# Patient Record
Sex: MALE | Race: BLACK OR AFRICAN AMERICAN | NOT HISPANIC OR LATINO | Employment: OTHER | URBAN - METROPOLITAN AREA
[De-identification: names, ages, dates, MRNs, and addresses within clinical notes are randomized per-mention and may not be internally consistent; named-entity substitution may affect disease eponyms.]

---

## 2017-01-03 DIAGNOSIS — M17.0 PRIMARY OSTEOARTHRITIS OF BOTH KNEES: Primary | ICD-10-CM

## 2017-01-03 DIAGNOSIS — N18.30 CHRONIC KIDNEY DISEASE, STAGE III (MODERATE): Primary | ICD-10-CM

## 2017-01-03 RX ORDER — SODIUM BICARBONATE 650 MG/1
TABLET ORAL
Refills: 3 | Status: CANCELLED | COMMUNITY
Start: 2017-01-03

## 2017-01-04 ENCOUNTER — CLINICAL SUPPORT (OUTPATIENT)
Dept: REHABILITATION | Facility: HOSPITAL | Age: 72
End: 2017-01-04
Attending: NEUROLOGICAL SURGERY
Payer: MEDICARE

## 2017-01-04 DIAGNOSIS — R26.9 GAIT DIFFICULTY: ICD-10-CM

## 2017-01-04 PROCEDURE — 97110 THERAPEUTIC EXERCISES: CPT | Mod: PO

## 2017-01-04 RX ORDER — SODIUM BICARBONATE 650 MG/1
TABLET ORAL
Refills: 3 | COMMUNITY
Start: 2017-01-04 | End: 2017-05-12 | Stop reason: SDUPTHER

## 2017-01-04 NOTE — PROGRESS NOTES
"Name: Jesús Olvera  [facility] Number: [id number]  2017.     Diagnosis: s/p lumbar fusion   Physician: Ugo Saah MD  Treatment Orders: PT Eval and Treat    Past Medical History   Diagnosis Date    Anemia associated with chronic renal failure     Arthritis     CAD (coronary atherosclerotic disease)     CHF (congestive heart failure)     -donor kidney transplant 2004    Diabetes mellitus     Encounter for blood transfusion     GIB (gastrointestinal bleeding) 10/23/2014    Neurogenic bladder     Osteomyelitis of lumbar vertebra 10/20/2014    PVD (peripheral vascular disease)      Heel ulcers with osteomyelitis, subsequent hemipedectomy on the left      Renal hypertension     Secondary hyperparathyroidism of renal origin     Secondary myopathy      steriod induced    Thyroid nodule 2016     Current Outpatient Prescriptions   Medication Sig    amlodipine (NORVASC) 10 MG tablet Take 10 mg by mouth once daily.      aspirin 81 MG Chew Take 81 mg by mouth once daily.    BD INSULIN SYRINGE ULTRA-FINE 1/2 mL 31 x 5/16" Syrg     carvedilol (COREG) 25 MG tablet Take 2 tablets (50 mg total) by mouth 2 (two) times daily with meals.    dorzolamide-timolol 2-0.5% (COSOPT) 22.3-6.8 mg/mL ophthalmic solution     ferrous sulfate 325 (65 FE) MG EC tablet Take 1 tablet (325 mg total) by mouth once daily.    gabapentin (NEURONTIN) 300 MG capsule Take 300 mg by mouth 3 (three) times daily.    hydrALAZINE (APRESOLINE) 10 MG tablet Take 1 tablet (10 mg total) by mouth every 12 (twelve) hours.    insulin aspart (NOVOLOG) 100 unit/mL injection Inject 10 Units into the skin 3 (three) times daily with meals. (Patient taking differently: Inject 10 Units into the skin 3 (three) times daily with meals. Pt stated that he is taking 13 units 3 times daily w/ meals. CF)    LANTUS 100 unit/mL injection Inject 20 Units into the skin 2 (two) times daily.    LUMIGAN 0.01 % Drop Place 1 drop into both eyes " every evening.     multivitamin capsule Take 1 capsule by mouth once daily.    mycophenolate (CELLCEPT) 250 mg Cap Take 2 capsules (500 mg total) by mouth 2 (two) times daily.    omeprazole (PRILOSEC) 20 MG capsule Take 2 capsules (40 mg total) by mouth once daily.    oxycodone-acetaminophen (LYNOX) 7.5-300 mg per tablet Take 1 tablet by mouth every 6 (six) hours as needed for Pain.    oxycodone-acetaminophen (PERCOCET) 7.5-325 mg per tablet Take 1 tablet by mouth every 4 (four) hours as needed for Pain.    predniSONE (DELTASONE) 5 MG tablet Take 5 mg by mouth once daily.      senna-docusate 8.6-50 mg (PERICOLACE) 8.6-50 mg per tablet Take 1 tablet by mouth 2 (two) times daily as needed for Constipation.    simvastatin (ZOCOR) 40 MG tablet 40 mg every evening.     sodium bicarbonate 650 MG tablet TAKE 1 TABLET (650 MG TOTAL) BY MOUTH ONCE DAILY.    tacrolimus (PROGRAF) 1 MG Cap Take 2 capsules (2 mg total) by mouth every 12 (twelve) hours.    tamsulosin (FLOMAX) 0.4 mg Cp24 take 1 capsule by mouth once daily    valsartan (DIOVAN) 160 MG tablet Take 160 mg by mouth once daily.    vitamin renal formula, B-complex-vitamin c-folic acid, (TRIPHROCAPS) 1 mg Cap Take 1 capsule by mouth once daily.     No current facility-administered medications for this visit.      Review of patient's allergies indicates:  No Known Allergies  Precautions: fall; back brace 2/2 hardware failure of previous lumbar fusion       Subjective:      Jesús reports no new complaints. Reports both legs hurt when he is standing.     Pain is rated on a 0-10 scale with 0 being no pain and 10 being pain requiring emergency room visit.    Diagnostic Tests: Radiographs    Patient Goals for PT: standing, walking with AD      Objective:    Visit # 13  Time In: 1300  Time Out: 1400  Treatment time: 45'  Date of eval/re-eval: 12/1/2016    Gait 40' with WW, KIRILL followed behind, multiple sitting breaks  STS transfers mod [I]  WC to chair/mat  "transfers mod [I]  Standing balance 1 UE support 15"   Standing balance no UE support unable      [3] Therapeutic exercise x 15'   Breathing with forced exhale and glute sets 20x   3 x 12 of:    resisted shoulder extension pink cook band    LAQ 3# weights R/L alternating    rows orange cook band     Gait training 1' clock, 3' rest    3x STS, 10' gait with WW, WC following, STS with remaining time   ~ 5' on 3rd set   Written HEP Provided: yes    Patient education: HEP; 20" work 40" rest with activities  Jesús demo good understanding of the education provided. Patient demo good return demo of skill of exercises.[id number]    Problem List: deconditioned, co-morbidities; LE weakness;       Assessment:    Jesús tolerated tx without increase in sxs. Progressed distance for gait. Added STS for more volume.    Jesús will benefit from skilled PT services to address these impairments and progress towards the below listed functional goals.  Jesús is in agreement with the goals that will be addressed in the treatment plan.Jesús demonstrates no additional cultural, spiritual or educational needs and currently has no barriers to learning.      Medical necessity: see Problem List      Functional Goals  Time frame     1.   The pt will stand unsupported with normal posture  for > 5' without increase in sxs.  ongoing  6 weeks     2.   The pt will tolerate standing frame > 20" without increase in sxs. ongoing  6 weeks     3.   The pt will ambulate > 20' with WW for improved household ambulation. Ongoing   6-8 weeks     4.   The pt will be independent in performance and progression of HEP. ongoing    2-3 visits            Plan:      Proceed/Continue with POC.     Pt will be treated by physical therapy 1-2x/week during the certification period. Treatment will consist of therapeutic exercise, manual therapy, neuromuscular re-education, heat and cold modalities, electrical stimulation for pain relief and/or muscle activation, and any other " types of treatment hat may be deemed medically necessary. [place] may at times be seen by a PTA as part of the Rehab Team.       Physical Therapist: KRISTAL Bautista, PT, DPT, CSCS    I certify the need for these services furnished under this plan of treatment and while under my care.       ___________________________________  Physician/Referring Practitioner        _________________  Date of Signature

## 2017-01-10 ENCOUNTER — TELEPHONE (OUTPATIENT)
Dept: NEUROSURGERY | Facility: CLINIC | Age: 72
End: 2017-01-10

## 2017-01-10 DIAGNOSIS — M46.46 LUMBAR DISCITIS: ICD-10-CM

## 2017-01-10 DIAGNOSIS — M17.0 OSTEOARTHRITIS OF BOTH LOWER LEGS: Primary | ICD-10-CM

## 2017-01-10 DIAGNOSIS — Z98.1 HISTORY OF LUMBAR FUSION: ICD-10-CM

## 2017-01-10 NOTE — TELEPHONE ENCOUNTER
----- Message from Kailee Pa MA sent at 1/10/2017  4:54 PM CST -----  Just a reminder to let Hans know he is co-surgeon on this case with Yifan 1/25/2017.    Thanks

## 2017-01-11 ENCOUNTER — CLINICAL SUPPORT (OUTPATIENT)
Dept: REHABILITATION | Facility: HOSPITAL | Age: 72
End: 2017-01-11
Attending: NEUROLOGICAL SURGERY
Payer: MEDICARE

## 2017-01-11 DIAGNOSIS — R26.9 GAIT DIFFICULTY: ICD-10-CM

## 2017-01-11 PROCEDURE — G8979 MOBILITY GOAL STATUS: HCPCS | Mod: CK,PO

## 2017-01-11 PROCEDURE — G8978 MOBILITY CURRENT STATUS: HCPCS | Mod: CL,PO

## 2017-01-11 PROCEDURE — 97110 THERAPEUTIC EXERCISES: CPT | Mod: PO

## 2017-01-11 NOTE — PROGRESS NOTES
"Name: Jesús Olvera  [facility] Number: [id number]  2017.     Diagnosis: s/p lumbar fusion   Physician: Ugo Saha MD  Treatment Orders: PT Eval and Treat    Past Medical History   Diagnosis Date    Anemia associated with chronic renal failure     Arthritis     CAD (coronary atherosclerotic disease)     CHF (congestive heart failure)     -donor kidney transplant 2004    Diabetes mellitus     Encounter for blood transfusion     GIB (gastrointestinal bleeding) 10/23/2014    Neurogenic bladder     Osteomyelitis of lumbar vertebra 10/20/2014    PVD (peripheral vascular disease)      Heel ulcers with osteomyelitis, subsequent hemipedectomy on the left      Renal hypertension     Secondary hyperparathyroidism of renal origin     Secondary myopathy      steriod induced    Thyroid nodule 2016     Current Outpatient Prescriptions   Medication Sig    amlodipine (NORVASC) 10 MG tablet Take 10 mg by mouth once daily.      aspirin 81 MG Chew Take 81 mg by mouth once daily.    BD INSULIN SYRINGE ULTRA-FINE 1/2 mL 31 x 5/16" Syrg     carvedilol (COREG) 25 MG tablet Take 2 tablets (50 mg total) by mouth 2 (two) times daily with meals.    dorzolamide-timolol 2-0.5% (COSOPT) 22.3-6.8 mg/mL ophthalmic solution     ferrous sulfate 325 (65 FE) MG EC tablet Take 1 tablet (325 mg total) by mouth once daily.    gabapentin (NEURONTIN) 300 MG capsule Take 300 mg by mouth 3 (three) times daily.    hydrALAZINE (APRESOLINE) 10 MG tablet Take 1 tablet (10 mg total) by mouth every 12 (twelve) hours.    insulin aspart (NOVOLOG) 100 unit/mL injection Inject 10 Units into the skin 3 (three) times daily with meals. (Patient taking differently: Inject 10 Units into the skin 3 (three) times daily with meals. Pt stated that he is taking 13 units 3 times daily w/ meals. CF)    LANTUS 100 unit/mL injection Inject 20 Units into the skin 2 (two) times daily.    LUMIGAN 0.01 % Drop Place 1 drop into both eyes " every evening.     multivitamin capsule Take 1 capsule by mouth once daily.    mycophenolate (CELLCEPT) 250 mg Cap Take 2 capsules (500 mg total) by mouth 2 (two) times daily.    omeprazole (PRILOSEC) 20 MG capsule Take 2 capsules (40 mg total) by mouth once daily.    oxycodone-acetaminophen (LYNOX) 7.5-300 mg per tablet Take 1 tablet by mouth every 6 (six) hours as needed for Pain.    oxycodone-acetaminophen (PERCOCET) 7.5-325 mg per tablet Take 1 tablet by mouth every 4 (four) hours as needed for Pain.    predniSONE (DELTASONE) 5 MG tablet Take 5 mg by mouth once daily.      senna-docusate 8.6-50 mg (PERICOLACE) 8.6-50 mg per tablet Take 1 tablet by mouth 2 (two) times daily as needed for Constipation.    simvastatin (ZOCOR) 40 MG tablet 40 mg every evening.     sodium bicarbonate 650 MG tablet TAKE 1 TABLET (650 MG TOTAL) BY MOUTH ONCE DAILY.    tacrolimus (PROGRAF) 1 MG Cap Take 2 capsules (2 mg total) by mouth every 12 (twelve) hours.    tamsulosin (FLOMAX) 0.4 mg Cp24 take 1 capsule by mouth once daily    valsartan (DIOVAN) 160 MG tablet Take 160 mg by mouth once daily.    vitamin renal formula, B-complex-vitamin c-folic acid, (TRIPHROCAPS) 1 mg Cap Take 1 capsule by mouth once daily.     No current facility-administered medications for this visit.      Review of patient's allergies indicates:  No Known Allergies  Precautions: fall; back brace 2/2 hardware failure of previous lumbar fusion       Subjective:      Jesús reports no new complaints. Reports both legs hurt when he is standing.     Pain is rated on a 0-10 scale with 0 being no pain and 10 being pain requiring emergency room visit.    Diagnostic Tests: Radiographs    Patient Goals for PT: standing, walking with AD      Objective:    Visit # 14  Time In: 1300  Time Out: 1400  Treatment time: 45'  Date of eval/re-eval: 12/1/2016    Gait 40' with WW, KIRILL followed behind, multiple sitting breaks  STS transfers mod [I]  WC to chair/mat  "transfers mod [I]  Standing balance 1 UE support 15"   Standing balance no UE support unable      [3] Therapeutic exercise x 15'   Nu-step 10'   LAQ 3 sets of max reps with 5" hold 3#, 3' rest between sets ~ 10x/set  Standing frame 10' 4 x 10 shoulder flexion throughout 10'   4 step up/down with mod assist BUE support    --np--  Breathing with forced exhale and glute sets 20x   3 x 12 of:    resisted shoulder extension pink cook band    LAQ 3# weights R/L alternating    rows orange cook band     Gait training 1' clock, 3' rest    3x STS, 10' gait with WW, WC following, STS with remaining time   ~ 5' on 3rd set   Written HEP Provided: yes    Patient education: HEP; 20" work 40" rest with activities  Jesús demo good understanding of the education provided. Patient demo good return demo of skill of exercises.1315    Problem List: deconditioned, co-morbidities; LE weakness;       Assessment:    Jesús tolerated tx without increase in sxs. Able to perform stair climbing, which is promising as he has steps at home. Will update goals to include stair goal.  Consider 2x/week PT treatments since doing well and tolerating activities.   Jesús will benefit from skilled PT services to address these impairments and progress towards the below listed functional goals.  eJsús is in agreement with the goals that will be addressed in the treatment plan.Jesús demonstrates no additional cultural, spiritual or educational needs and currently has no barriers to learning.      Medical necessity: see Problem List      Functional Goals  Time frame     1.   The pt will stand unsupported with normal posture  for > 5' without increase in sxs.  ongoing  6 weeks     2.   The pt will tolerate standing frame > 20" without increase in sxs. Ongoing 10'   6 weeks     3.   The pt will ambulate > 20' with WW for improved household ambulation. MET  6-8 weeks     4.   The pt will be independent in performance and progression of HEP. ongoing    2-3 " visits     5.    The pt will ambulate safely / [I] up/down the flight up stairs in his home using BUE support for improved household ambulation.   6 weeks     6.    The pt will ambulate > 100' in 2 minutes with WW for improved household ambulation.   6 weeks                      Plan:      Proceed/Continue with POC.     Pt will be treated by physical therapy 1-2x/week during the certification period. Treatment will consist of therapeutic exercise, manual therapy, neuromuscular re-education, heat and cold modalities, electrical stimulation for pain relief and/or muscle activation, and any other types of treatment hat may be deemed medically necessary. Jesús may at times be seen by a PTA as part of the Rehab Team.       Physical Therapist: KRISTAL Bautista, PT, DPT, CSCS    I certify the need for these services furnished under this plan of treatment and while under my care.       ___________________________________  Physician/Referring Practitioner        _________________  Date of Signature

## 2017-01-13 ENCOUNTER — TELEPHONE (OUTPATIENT)
Dept: NEUROSURGERY | Facility: CLINIC | Age: 72
End: 2017-01-13

## 2017-01-13 DIAGNOSIS — Z98.1 HISTORY OF LUMBAR FUSION: Primary | ICD-10-CM

## 2017-01-13 DIAGNOSIS — M46.46 LUMBAR DISCITIS: ICD-10-CM

## 2017-01-24 ENCOUNTER — OFFICE VISIT (OUTPATIENT)
Dept: NEUROSURGERY | Facility: CLINIC | Age: 72
End: 2017-01-24
Payer: MEDICARE

## 2017-01-24 ENCOUNTER — HOSPITAL ENCOUNTER (OUTPATIENT)
Dept: RADIOLOGY | Facility: HOSPITAL | Age: 72
Discharge: HOME | End: 2017-01-24
Attending: NEUROLOGICAL SURGERY
Payer: MEDICARE

## 2017-01-24 VITALS
SYSTOLIC BLOOD PRESSURE: 134 MMHG | HEART RATE: 55 BPM | HEIGHT: 74 IN | DIASTOLIC BLOOD PRESSURE: 70 MMHG | BODY MASS INDEX: 26.95 KG/M2 | WEIGHT: 210 LBS

## 2017-01-24 DIAGNOSIS — N25.0 RENAL OSTEODYSTROPHY: ICD-10-CM

## 2017-01-24 DIAGNOSIS — M40.05 POSTURAL KYPHOSIS OF THORACOLUMBAR REGION: Primary | ICD-10-CM

## 2017-01-24 DIAGNOSIS — M40.05 POSTURAL KYPHOSIS OF THORACOLUMBAR REGION: ICD-10-CM

## 2017-01-24 PROCEDURE — 99499 UNLISTED E&M SERVICE: CPT | Mod: S$GLB,,, | Performed by: NEUROLOGICAL SURGERY

## 2017-01-24 PROCEDURE — 3078F DIAST BP <80 MM HG: CPT | Mod: S$GLB,,, | Performed by: NEUROLOGICAL SURGERY

## 2017-01-24 PROCEDURE — 1126F AMNT PAIN NOTED NONE PRSNT: CPT | Mod: S$GLB,,, | Performed by: NEUROLOGICAL SURGERY

## 2017-01-24 PROCEDURE — 99204 OFFICE O/P NEW MOD 45 MIN: CPT | Mod: S$GLB,,, | Performed by: NEUROLOGICAL SURGERY

## 2017-01-24 PROCEDURE — 72020 X-RAY EXAM OF SPINE 1 VIEW: CPT | Mod: TC

## 2017-01-24 PROCEDURE — 3075F SYST BP GE 130 - 139MM HG: CPT | Mod: S$GLB,,, | Performed by: NEUROLOGICAL SURGERY

## 2017-01-24 PROCEDURE — 1159F MED LIST DOCD IN RCRD: CPT | Mod: S$GLB,,, | Performed by: NEUROLOGICAL SURGERY

## 2017-01-24 PROCEDURE — 1160F RVW MEDS BY RX/DR IN RCRD: CPT | Mod: S$GLB,,, | Performed by: NEUROLOGICAL SURGERY

## 2017-01-24 PROCEDURE — 99999 PR PBB SHADOW E&M-EST. PATIENT-LVL IV: CPT | Mod: PBBFAC,,, | Performed by: NEUROLOGICAL SURGERY

## 2017-01-24 PROCEDURE — 1157F ADVNC CARE PLAN IN RCRD: CPT | Mod: S$GLB,,, | Performed by: NEUROLOGICAL SURGERY

## 2017-01-24 PROCEDURE — 72020 X-RAY EXAM OF SPINE 1 VIEW: CPT | Mod: 26,,, | Performed by: RADIOLOGY

## 2017-01-24 RX ORDER — OXYCODONE AND ACETAMINOPHEN 7.5; 325 MG/1; MG/1
1 TABLET ORAL EVERY 4 HOURS PRN
Qty: 120 TABLET | Refills: 0 | Status: SHIPPED | OUTPATIENT
Start: 2017-01-24 | End: 2017-12-05

## 2017-01-24 NOTE — LETTER
January 24, 2017      Ugo Saha MD  [address]  [facility] [address]           [facility]  [address]  [facility] [address]  Phone: [phone]          Patient: Jesús Olvera   MR Number: [id number]   YOB: 1945   Date of Visit: 1/24/2017       Dear Dr. Ugo Saha:    Thank you for referring Jesús Olvera to me for evaluation. Attached you will find relevant portions of my assessment and plan of care.    If you have questions, please do not hesitate to call me. I look forward to following Jesús Olvera along with you.    Sincerely,    William Maxwell MD    Enclosure  CC:  No Recipients    If you would like to receive this communication electronically, please contact [email or web address] or [phone] to request more information on [employer] access.    For providers and/or their staff who would like to refer a patient to [facility], please contact us through our one-stop-shop provider referral line, [facility], at [phone].    If you feel you have received this communication in error or would no longer like to receive these types of communications, please e-mail [email or web address]

## 2017-01-24 NOTE — PROGRESS NOTES
Dear Dr. Saha,    Thank you for referring this patient to my clinic. The full details of my evaluation will also be forthcoming to you by letter.    CHIEF COMPLAINT:  Consult    I, Joanne Edwards, am scribing for, and in the presence of, Dr. Maxwell.    HPI:  Jesús Olvera is a 71 y.o.  male with a history of lumbar osteomyelitis, peripheral vascular disease, CAD, CHF, kidney transplant, diabetes mellitus, and on Aspirin, who is referred to me by Dr. Saha to for evaluation of low back pain. Pt presents today to discuss surgery. Pt reports low back pain; however, the pain is somewhat tolerable with pain medication. Pt does not walk, secondary to back pain and BLE weakness. Pt reports stiffness in the low back while standing but denies pain. Pt denies b/b dysfunction. Pt notes some improvement in back pain while using the TLSO brace.    Pt presents today in a TLSO brace and a wheelchair. Pt was able to stand/walk with a walker in ; however, shortly after, pt became ill with pneumonia and a bladder infection. Pt states that he never fully recovered from these illnesses resulting in BLE weakness. Pt's kidney transplant was 12 years ago.    Review of patient's allergies indicates:  No Known Allergies    Past Medical History   Diagnosis Date    Anemia associated with chronic renal failure     Arthritis     CAD (coronary atherosclerotic disease)     CHF (congestive heart failure)     -donor kidney transplant 2004    Diabetes mellitus     Encounter for blood transfusion     GIB (gastrointestinal bleeding) 10/23/2014    Neurogenic bladder     Osteomyelitis of lumbar vertebra 10/20/2014    PVD (peripheral vascular disease)      Heel ulcers with osteomyelitis, subsequent hemipedectomy on the left      Renal hypertension     Secondary hyperparathyroidism of renal origin     Secondary myopathy      steriod induced    Thyroid nodule 2016     Past Surgical History   Procedure Laterality Date     Hemipedectomy       left foot    Kidney transplant  8/2/04    Back surgery      Eye surgery      Vascular surgery       Family History   Problem Relation Age of Onset    Alzheimer's disease Mother     Diabetes Mother     Heart disease Father     Diabetes Sister     Diabetes Brother      Social History   Substance Use Topics    Smoking status: Former Smoker     Quit date: 3/14/1974    Smokeless tobacco: Never Used    Alcohol use No      Comment: stopped 25yrs ago        Review of Systems   HENT: Negative.    Eyes: Negative.    Respiratory: Negative.    Cardiovascular: Negative.    Gastrointestinal: Negative.    Endocrine: Negative.    Genitourinary: Negative.    Musculoskeletal: Positive for back pain (Low back pain) and gait problem (Wheelchair). Negative for neck pain.   Skin: Negative.    Allergic/Immunologic: Negative.    Neurological: Positive for weakness (BLE). Negative for light-headedness, numbness and headaches.   Hematological: Negative.    Psychiatric/Behavioral: Negative.        OBJECTIVE:   Vital Signs:  Pulse: (!) 55 (01/24/17 0830)  BP: 134/70 (01/24/17 0830)    Physical Exam:    Vital signs: All nursing notes and vital signs reviewed -- afebrile, vital signs stable.  Constitutional: Patient sitting comfortably in chair. Appears well developed and well nourished.  Skin: Exposed areas are intact without abnormal markings, rashes or other lesions. Well healed scar lumbosacral region.  HEENT: Normocephalic. Normal conjunctivae.  Cardiovascular: Normal rate and regular rhythm.  Respiratory: Chest wall rises and falls symmetrically, without signs of respiratory distress.  Abdomen: Soft and non-tender.  Extremities: Warm and without edema. Calves supple, non-tender.  Psych/Behavior: Normal affect.    Neurological:    Mental status: Alert and oriented. Conversational and appropriate.       Cranial Nerves: Grossly intact.    Motor:    Upper:  Deltoids Triceps Biceps WE WF     R 5/5 5/5 5/5  5/5 5/5 5/5    L 5/5 5/5 5/5 5/5 5/5 5/5      Lower:  HF KE KF DF PF EHL    R 4-/5 5/5 5/5 2/5 2/5 2/5    L 4-/5 4/5 5/5 5/5 5/5 5/5     Partial amputation left foot.    Sensory: Intact sensation to light touch in all extremities. Romberg negative.    Reflexes:          DTR: 2+ symmetrically throughout.     Araujo's: Negative.     Babinski's: Negative.     Clonus: Negative.    Cerebellar: Finger-to-nose and rapid alternating movements normal. Gait stable, fluid.    Spine:    Posture: Head well aligned over pelvis in front and side views.  No focal or global spinal deformity visible on inspection. Shoulders and hips even. No obvious leg length discrepancy. No scapula winging.    Bending: Full ROM with forward, back and lateral bending. No rib prominence with forward bend.    Cervical:      ROM: Full with flexion, extension, lateral rotation and ear-to-shoulder bend.      Midline TTP: Negative.     Spurling's test: Negative.     Lhermitte's: Negative.    Thoracic:     Midline TTP: Negative    Lumbar:     Midline TTP: Negative     Straight Leg Test: Negative     Crossed Straight Leg Test: Negative     Sciatic notch tenderness: Negative.    Other:     SI joint TTP: Negative.     Greater trochanter TTP: Negative.     Tenderness with external/internal hip rotation: Negative.    Diagnostic Results:  All imaging was independently reviewed by me.    CT L-spine, dated 9/15/2016:  1. Screw leucencies in bilateral L3 screws and bilateral S1 screws  2. Advanced degeneration of L2 vertebral body, superior L3 vertebral, and L2 disc space    Scoliosis standing AP and Lateral X-ray, dated 9/27/2016:  1. Proximal junctional kyphosis above L3 measuring 18 degrees  2. Great than 10 cm positive sagittal balance  3. Less than 5 cm positive right global coronal balance    ASSESSMENT/PLAN:     Jesús Olvera is a renal transplant patient on chronic immunosuppression with multiple previous instrumented lumbar fusions for  discitis/osteomyelitis who now presents with lumbar hardware failure and proximal junctional failure causing sagittal plane deformity. The hardware failure is a consequence of his renal osteodystrophy, chronic immunosuppression therapy and resultant poor bone quality. He had been scheduled for extension and revision of his previous hardware by my colleague, Dr. Saha, for debilitating pain with movement and inability to walk due to pain. Today he reports significant improvement in his back pain since his last encounter, which is now well tolerated with only 1-2 opioid pills a day. He is unable to ambulate due to deconditioning of his legs after a severe pneumonia requiring hospitalization 6 months ago, but not due to pain or postural deformity. He has made some improvement with his leg strength and transitioning with rehab. Given his medical status and poor bone quality, I estimate his surgical risk to be greater than 90%. As his pain is now tolerated, the benefits do not outweigh the risks at this time, and I have advised against proceeding with surgery now. I will acquire dynamic xrays to reassess for gross instability. I will also reorder PT, refill his narcotics, and refer him to Endocrinology for evaluation and treatment of his renal osteodystrophy. He will need to strengthen his bones should surgery become necessary in the future. He will follow up with me in 3 months for re-evaluation.    The patient understands and agrees with the plan of care. All questions were answered.     1. Upright Supine X-ray  2. Referral to physical therapy  3. Referral to Endocrinology  4. Refill Percocet  5. RTC 3 months    I, Dr. Maxwell, personally performed the services described in this documentation as scribed by Joanne Edwards in my presence, and it is both accurate and complete.      William Maxwell M.D.  Department of Neurosurgery  [facility]

## 2017-01-30 DIAGNOSIS — E11.9 TYPE 2 DIABETES MELLITUS WITHOUT COMPLICATION: ICD-10-CM

## 2017-01-31 DIAGNOSIS — N18.30 CHRONIC KIDNEY DISEASE, STAGE III (MODERATE): Primary | ICD-10-CM

## 2017-01-31 RX ORDER — MYCOPHENOLATE MOFETIL 250 MG/1
500 CAPSULE ORAL 2 TIMES DAILY
Qty: 120 CAPSULE | Refills: 0 | Status: SHIPPED | OUTPATIENT
Start: 2017-01-31 | End: 2017-04-05 | Stop reason: SDUPTHER

## 2017-02-06 ENCOUNTER — OFFICE VISIT (OUTPATIENT)
Dept: ENDOCRINOLOGY | Facility: CLINIC | Age: 72
End: 2017-02-06
Payer: MEDICARE

## 2017-02-06 ENCOUNTER — CLINICAL SUPPORT (OUTPATIENT)
Dept: REHABILITATION | Facility: HOSPITAL | Age: 72
End: 2017-02-06
Attending: NEUROLOGICAL SURGERY
Payer: MEDICARE

## 2017-02-06 VITALS
SYSTOLIC BLOOD PRESSURE: 124 MMHG | WEIGHT: 210 LBS | HEIGHT: 74 IN | HEART RATE: 71 BPM | BODY MASS INDEX: 26.95 KG/M2 | DIASTOLIC BLOOD PRESSURE: 83 MMHG

## 2017-02-06 DIAGNOSIS — S32.000S COMPRESSION FRACTURE OF LUMBAR VERTEBRA, SEQUELA: Primary | ICD-10-CM

## 2017-02-06 DIAGNOSIS — E55.9 VITAMIN D DEFICIENCY: ICD-10-CM

## 2017-02-06 DIAGNOSIS — R53.81 DEBILITY: ICD-10-CM

## 2017-02-06 DIAGNOSIS — E21.3 HYPERPARATHYROIDISM: ICD-10-CM

## 2017-02-06 DIAGNOSIS — Z94.0 DECEASED-DONOR KIDNEY TRANSPLANT: Chronic | ICD-10-CM

## 2017-02-06 DIAGNOSIS — E10.42 TYPE 1 DIABETES MELLITUS WITH DIABETIC POLYNEUROPATHY: ICD-10-CM

## 2017-02-06 DIAGNOSIS — R26.9 GAIT DIFFICULTY: ICD-10-CM

## 2017-02-06 DIAGNOSIS — D84.9 IMMUNOSUPPRESSED STATUS: ICD-10-CM

## 2017-02-06 PROCEDURE — G8979 MOBILITY GOAL STATUS: HCPCS | Mod: CK,PO

## 2017-02-06 PROCEDURE — 99999 PR PBB SHADOW E&M-EST. PATIENT-LVL III: CPT | Mod: PBBFAC,GC,, | Performed by: INTERNAL MEDICINE

## 2017-02-06 PROCEDURE — 99204 OFFICE O/P NEW MOD 45 MIN: CPT | Mod: GC,S$GLB,, | Performed by: INTERNAL MEDICINE

## 2017-02-06 PROCEDURE — 2022F DILAT RTA XM EVC RTNOPTHY: CPT | Mod: GC,S$GLB,, | Performed by: INTERNAL MEDICINE

## 2017-02-06 PROCEDURE — 3045F PR MOST RECENT HEMOGLOBIN A1C LEVEL 7.0-9.0%: CPT | Mod: GC,S$GLB,, | Performed by: INTERNAL MEDICINE

## 2017-02-06 PROCEDURE — 1157F ADVNC CARE PLAN IN RCRD: CPT | Mod: GC,S$GLB,, | Performed by: INTERNAL MEDICINE

## 2017-02-06 PROCEDURE — 1160F RVW MEDS BY RX/DR IN RCRD: CPT | Mod: GC,S$GLB,, | Performed by: INTERNAL MEDICINE

## 2017-02-06 PROCEDURE — 1126F AMNT PAIN NOTED NONE PRSNT: CPT | Mod: GC,S$GLB,, | Performed by: INTERNAL MEDICINE

## 2017-02-06 PROCEDURE — 1159F MED LIST DOCD IN RCRD: CPT | Mod: GC,S$GLB,, | Performed by: INTERNAL MEDICINE

## 2017-02-06 PROCEDURE — 3066F NEPHROPATHY DOC TX: CPT | Mod: GC,S$GLB,, | Performed by: INTERNAL MEDICINE

## 2017-02-06 PROCEDURE — 3074F SYST BP LT 130 MM HG: CPT | Mod: GC,S$GLB,, | Performed by: INTERNAL MEDICINE

## 2017-02-06 PROCEDURE — 3079F DIAST BP 80-89 MM HG: CPT | Mod: GC,S$GLB,, | Performed by: INTERNAL MEDICINE

## 2017-02-06 PROCEDURE — 97110 THERAPEUTIC EXERCISES: CPT | Mod: PO

## 2017-02-06 PROCEDURE — G8978 MOBILITY CURRENT STATUS: HCPCS | Mod: CL,PO

## 2017-02-06 RX ORDER — VIT C/E/ZN/COPPR/LUTEIN/ZEAXAN 250MG-90MG
2000 CAPSULE ORAL DAILY
Refills: 0 | COMMUNITY
Start: 2017-02-06

## 2017-02-06 NOTE — MR AVS SNAPSHOT
[facility]Metab  [address]  [facility] [address]  Phone: [phone]  Fax: [phone]                  Jesús Olvera   2017 8:00 AM   Office Visit    Description:  Male : 1945   Provider:  Katie Rogel MD   Department:  [facility]/Di[facility]/Metab           Reason for Visit     Thyroid Nodule     Diabetes Mellitus           Diagnoses this Visit        Comments    Compression fracture of lumbar vertebra, sequela    -  Primary            To Do List           Future Appointments        Provider Department Dept Phone    2017 11:00 AM Chris Bautista PT [facility] [phone]    2017 8:20 AM [facility], DEXA1 [facility] [phone]    2017 8:50 AM LAB, APPOINTMENT [place] [facility] [phone]    2017 9:00 AM William Maxwell MD [facility] - Neurosurgery [facility] [phone]    2017 10:00 AM LAB, [place] [facility] [phone]      Goals (5 Years of Data)     None      Follow-Up and Disposition     Return in about 1 year (around 2018).      PURCHASE these Medications (No prescription required)        Start End    cholecalciferol, vitamin D3, 1,000 unit capsule 2017     Sig - Route: Take 2 capsules (2,000 Units total) by mouth once daily. - Oral    Class: OTC      [facility] On [facility]     [facility] Care [facility] -  Assistance  Registered nurses in the [facility] provide clinical advisement, health education, appointment booking, and other advisory services.  Call for this free service at [phone].             Medications           Message regarding Medications     Verify the changes and/or additions to your medication regime listed below are the same as discussed with your clinician today.  If any of these changes or additions are incorrect, please notify your healthcare provider.        START taking these NEW medications        Refills     "cholecalciferol, vitamin D3, 1,000 unit capsule 0    Sig: Take 2 capsules (2,000 Units total) by mouth once daily.    Class: OTC    Route: Oral           Verify that the below list of medications is an accurate representation of the medications you are currently taking.  If none reported, the list may be blank. If incorrect, please contact your healthcare provider. Carry this list with you in case of emergency.           Current Medications     amlodipine (NORVASC) 10 MG tablet Take 10 mg by mouth once daily.      aspirin 81 MG Chew Take 81 mg by mouth every evening.     BD INSULIN SYRINGE ULTRA-FINE 1/2 mL 31 x 5/16" Syrg     carvedilol (COREG) 25 MG tablet Take 2 tablets (50 mg total) by mouth 2 (two) times daily with meals.    dorzolamide-timolol 2-0.5% (COSOPT) 22.3-6.8 mg/mL ophthalmic solution     ferrous sulfate 325 (65 FE) MG EC tablet Take 1 tablet (325 mg total) by mouth once daily.    gabapentin (NEURONTIN) 300 MG capsule Take 300 mg by mouth 3 (three) times daily.    hydrALAZINE (APRESOLINE) 10 MG tablet Take 1 tablet (10 mg total) by mouth every 12 (twelve) hours.    insulin aspart (NOVOLOG) 100 unit/mL injection Inject 10 Units into the skin 3 (three) times daily with meals.    LANTUS 100 unit/mL injection Inject 20 Units into the skin 2 (two) times daily.    LUMIGAN 0.01 % Drop Place 1 drop into both eyes every evening.     multivitamin capsule Take 1 capsule by mouth once daily.    mycophenolate (CELLCEPT) 250 mg Cap Take 2 capsules (500 mg total) by mouth 2 (two) times daily.    omeprazole (PRILOSEC) 20 MG capsule Take 2 capsules (40 mg total) by mouth once daily.    oxycodone-acetaminophen (LYNOX) 7.5-300 mg per tablet Take 1 tablet by mouth every 6 (six) hours as needed for Pain.    oxycodone-acetaminophen (PERCOCET) 7.5-325 mg per tablet Take 1 tablet by mouth every 4 (four) hours as needed for Pain.    predniSONE (DELTASONE) 5 MG tablet Take 5 mg by mouth once daily.      senna-docusate 8.6-50 " "mg (PERICOLACE) 8.6-50 mg per tablet Take 1 tablet by mouth 2 (two) times daily as needed for Constipation.    simvastatin (ZOCOR) 40 MG tablet 40 mg every evening.     sodium bicarbonate 650 MG tablet TAKE 1 TABLET (650 MG TOTAL) BY MOUTH ONCE DAILY.    tacrolimus (PROGRAF) 1 MG Cap Take 2 capsules (2 mg total) by mouth every 12 (twelve) hours.    tamsulosin (FLOMAX) 0.4 mg Cp24 take 1 capsule by mouth once daily    valsartan (DIOVAN) 160 MG tablet Take 160 mg by mouth once daily.    vitamin renal formula, B-complex-vitamin c-folic acid, (TRIPHROCAPS) 1 mg Cap Take 1 capsule by mouth once daily.    cholecalciferol, vitamin D3, 1,000 unit capsule Take 2 capsules (2,000 Units total) by mouth once daily.           Clinical Reference Information           Your Vitals Were     BP Pulse Height Weight BMI    124/83 (BP Location: Left arm, Patient Position: Sitting) 71 6' 2" (1.88 m) 95.3 kg (210 lb) 26.96 kg/m2      Blood Pressure          Most Recent Value    BP  124/83      Allergies as of 2/6/2017     No Known Allergies      Immunizations Administered on Date of Encounter - 2/6/2017     None      Orders Placed During Today's Visit     Future Labs/Procedures Expected by Expires    C TELOPEPTIDE (CTX), SERUM  2/6/2017 4/7/2018    DXA Bone Density Spine And Hip_Axial Skeleton  2/6/2017 2/6/2018    PROCOLLAGEN TYPE 1 PROPEPTIDE  2/6/2017 4/7/2018    PTH, intact  2/6/2017 4/7/2018    RENAL FUNCTION PANEL  2/6/2017 4/7/2018    Vitamin D  5/7/2017 4/7/2018      Maintenance Dialysis History     Patient has no recorded history of maintenance dialysis.      Transplant Information        Txp Date Organ Coordinator Care Team    8/2/2004 Kidney Giselle Chavez RN Surgeon:  Ilya Yu MD   Current Nephrologist:  Kingston Thomson MD         Instructions    Start vit d 2000 iu daily over the counter        Language Assistance Services     ATTENTION: Language assistance services are available, free of charge. Please call " [phone].      ATENCIÓN: Si habla español, tiene a suarez disposición servicios gratuitos de asistencia lingüística. Llame al [phone].     CHÚ Ý: N?u b?n nói Ti?ng Vi?t, có các d?ch v? h? tr? ngôn ng? mi?n phí dành cho b?n. G?i s? [phone].         [facility] complies with applicable Federal civil rights laws and does not discriminate on the basis of race, color, national origin, age, disability, or sex.

## 2017-02-06 NOTE — PROGRESS NOTES
I  Bryan Almaraz have personally taken the history and examined this patient and agree with the fellow's note.

## 2017-02-06 NOTE — PROGRESS NOTES
Subjective:      Patient ID: Jesús Olvera is a 71 y.o. male.    Chief Complaint:  Thyroid Nodule and Diabetes Mellitus      History of Present Illness  Initial visit, referred by neurosurgery     Has T1DM managed by Dr Lake at [facility] (since age 28), hx of kidney transplant in 2004 (ESRD 2/2 T1DM was on HD for three years prior to tx), renal osteodystrophy, on chronic immunosuppression: cellcept, prograf, prednisone 5mg daily, and peripheral vascular disease LLE stent, CAD, CHF.     He saw neurosurgery for back pain (history of lumbar osteomyelitis, has hx of multiple previous instrumented lumbar fusions for discitis/osteomyelitis) for consideration of surgery.  He was told to have lumbar hardware failure and proximal junctional failure causing sagittal plane deformity but that he will need to strengthen his bones should surgery become necessary in the future and is currently high surgical risk candidate.     Review of MRI shows he has L2 compression fx. He denies clinical fractures. No prior DEXA or prior OP treatment.   Is not currently taking Ca or Vit D supplements  +fall risk, has fallen many times   Denies kidney stones      Pt presents today in a TLSO brace and a wheelchair. Pt was able to stand/walk with a walker in June; however, shortly after, pt became ill with pneumonia and a bladder infection. Pt states that he never fully recovered from these illnesses resulting in BLE weakness. Working with PT now, takes steps at a time, and does strengthening exercises.     Review of Systems   Constitutional: Positive for fatigue. Negative for unexpected weight change.   Eyes: Negative for visual disturbance.   Respiratory: Negative for shortness of breath.    Cardiovascular: Positive for leg swelling.   Gastrointestinal: Negative for abdominal pain.   Musculoskeletal: Positive for back pain. Negative for myalgias.   Skin: Negative for wound.   Neurological: Positive for weakness and numbness. Negative for  headaches.   Hematological: Bruises/bleeds easily.   Psychiatric/Behavioral: Negative for sleep disturbance.       Objective:   Physical Exam   Constitutional: He appears well-developed.   In wheelchair and wearing back brace    HENT:   Right Ear: External ear normal.   Left Ear: External ear normal.   Nose: Nose normal.   Hearing normal    Neck: No tracheal deviation present. No thyromegaly present.   Cardiovascular: Normal rate.    No murmur heard.  Pulmonary/Chest: Effort normal and breath sounds normal.   Abdominal: Soft. He exhibits no mass.   No hernia noted   Musculoskeletal: He exhibits no edema.   4/5 strength in BLE  Difficulty standing from seated position without assistance    Neurological: He is alert. No cranial nerve deficit or sensory deficit. Coordination and gait normal.   Decreased vibratory sensation b/l LE   +edema  S/p left TMA (about 5 years ago)     Skin: No rash noted.   Right foot with chronic ulcer, no active erythema, pain swelling, warmth or discharge. Pt states he is due to visit podiatry for maintenance    Psychiatric: He has a normal mood and affect. Judgment normal.   Vitals reviewed.      Assessment:     1. Compression fracture of lumbar vertebra, sequela  DXA Bone Density Spine And Hip_Axial Skeleton    C TELOPEPTIDE (CTX), SERUM    PROCOLLAGEN TYPE 1 PROPEPTIDE    Vitamin D    PTH, intact    RENAL FUNCTION PANEL   2. Type 1 diabetes mellitus with diabetic polyneuropathy     3. -donor kidney transplant 04     4. Immunosuppressed status     5. Debility     6. Hyperparathyroidism     7. Vitamin D deficiency          Plan:     Jesús was seen today for thyroid nodule and diabetes mellitus.    Diagnoses and all orders for this visit:    Compression fracture of lumbar vertebra, sequela  Will get DXA of hip and distal 1/3 radius to assess bone (unsure that lumbar DXA will be interpreted w hardware)   High fracture risk given his risk factors:   - Hyperparathyroidism: this can  "be 2HPT of renal origin (persistent s/p RTx) or this could be normocalcemia HPT   - steroid use    - T1DM   Given his vertebral fx, he meets criteria for treatment for OP.   If CTX is high, will choose antiresportive (bisphosphonate or prolia). If CTX is low, can choose to treat with anabolic therapy w forteo   Vit D insufficient, to start 2000 iu daily, recheck PTH, Vit D, renal panel in 3 months.   Encourage PT, weight bearing/strengthening/balancing exercises   -     DXA Bone Density Spine And Hip_Axial Skeleton; Future  -     C TELOPEPTIDE (CTX), SERUM; Future  -     PROCOLLAGEN TYPE 1 PROPEPTIDE; Future  -     Vitamin D; Future  -     PTH, intact; Future  -     RENAL FUNCTION PANEL; Future    Type 1 diabetes mellitus with diabetic polyneuropathy  Discussed benefits of sensor, he wishes to discuss this with Dr Lake who has been managing his DM "for years"     -donor kidney transplant 04    Immunosuppressed status    Debility    Hyperparathyroidism  Reassess labs after Vit D repletion, monitor for now.   This could be 2HPT/renal vs normocalcemia HPT, both of which can contribute to bone loss  Indications for parathyroidectomy in persistent HPT s/p Rental tx include: persistent hypercalcemia/hypercalciuria (which can be measured when he is vit d replete), nephrocalcinosis, osteodystrophy (currently has nl alk phos), vascular calcifications.     Vitamin D deficiency   -     cholecalciferol, vitamin D3, 1,000 unit capsule; Take 2 capsules (2,000 Units total) by mouth once daily.      Discussed w Dr Almaraz   rtc 1 year    "

## 2017-02-06 NOTE — PROGRESS NOTES
"Name: Jesús Olvera  [facility] Number: [id number]  2017.     Diagnosis: s/p lumbar fusion   Physician: Ugo Saha MD  Treatment Orders: PT Eval and Treat    Past Medical History   Diagnosis Date    Anemia associated with chronic renal failure     Arthritis     CAD (coronary atherosclerotic disease)     CHF (congestive heart failure)     -donor kidney transplant 2004    Diabetes mellitus     Encounter for blood transfusion     GIB (gastrointestinal bleeding) 10/23/2014    Neurogenic bladder     Osteomyelitis of lumbar vertebra 10/20/2014    PVD (peripheral vascular disease)      Heel ulcers with osteomyelitis, subsequent hemipedectomy on the left      Renal hypertension     Secondary hyperparathyroidism of renal origin     Secondary myopathy      steriod induced    Thyroid nodule 2016     Current Outpatient Prescriptions   Medication Sig    amlodipine (NORVASC) 10 MG tablet Take 10 mg by mouth once daily.      aspirin 81 MG Chew Take 81 mg by mouth every evening.     BD INSULIN SYRINGE ULTRA-FINE 1/2 mL 31 x 5/16" Syrg     carvedilol (COREG) 25 MG tablet Take 2 tablets (50 mg total) by mouth 2 (two) times daily with meals.    cholecalciferol, vitamin D3, 1,000 unit capsule Take 2 capsules (2,000 Units total) by mouth once daily.    dorzolamide-timolol 2-0.5% (COSOPT) 22.3-6.8 mg/mL ophthalmic solution     ferrous sulfate 325 (65 FE) MG EC tablet Take 1 tablet (325 mg total) by mouth once daily.    gabapentin (NEURONTIN) 300 MG capsule Take 300 mg by mouth 3 (three) times daily.    hydrALAZINE (APRESOLINE) 10 MG tablet Take 1 tablet (10 mg total) by mouth every 12 (twelve) hours.    insulin aspart (NOVOLOG) 100 unit/mL injection Inject 10 Units into the skin 3 (three) times daily with meals. (Patient taking differently: Inject 10 Units into the skin 3 (three) times daily with meals. Pt stated that he is taking 13 units 3 times daily w/ meals. CF)    LANTUS 100 unit/mL " injection Inject 20 Units into the skin 2 (two) times daily.    LUMIGAN 0.01 % Drop Place 1 drop into both eyes every evening.     multivitamin capsule Take 1 capsule by mouth once daily.    mycophenolate (CELLCEPT) 250 mg Cap Take 2 capsules (500 mg total) by mouth 2 (two) times daily.    omeprazole (PRILOSEC) 20 MG capsule Take 2 capsules (40 mg total) by mouth once daily.    oxycodone-acetaminophen (LYNOX) 7.5-300 mg per tablet Take 1 tablet by mouth every 6 (six) hours as needed for Pain.    oxycodone-acetaminophen (PERCOCET) 7.5-325 mg per tablet Take 1 tablet by mouth every 4 (four) hours as needed for Pain.    predniSONE (DELTASONE) 5 MG tablet Take 5 mg by mouth once daily.      senna-docusate 8.6-50 mg (PERICOLACE) 8.6-50 mg per tablet Take 1 tablet by mouth 2 (two) times daily as needed for Constipation.    simvastatin (ZOCOR) 40 MG tablet 40 mg every evening.     sodium bicarbonate 650 MG tablet TAKE 1 TABLET (650 MG TOTAL) BY MOUTH ONCE DAILY.    tacrolimus (PROGRAF) 1 MG Cap Take 2 capsules (2 mg total) by mouth every 12 (twelve) hours.    tamsulosin (FLOMAX) 0.4 mg Cp24 take 1 capsule by mouth once daily    valsartan (DIOVAN) 160 MG tablet Take 160 mg by mouth once daily.    vitamin renal formula, B-complex-vitamin c-folic acid, (TRIPHROCAPS) 1 mg Cap Take 1 capsule by mouth once daily.     No current facility-administered medications for this visit.      Review of patient's allergies indicates:  No Known Allergies  Precautions: fall; back brace 2/2 hardware failure of previous lumbar fusion       Subjective:      Jesús reports was told had a 95% chance of something going wrong if he was to get a fusion to l/s. Will not be doing it, and will work on getting stronger with PT.     Pain is rated on a 0-10 scale with 0 being no pain and 10 being pain requiring emergency room visit.    Diagnostic Tests: Radiographs    Patient Goals for PT: standing, walking with AD      Objective:    Visit #  "15  Time In: 1100  Time Out: 1145  Treatment time: 45'  Date of eval/re-eval: 2/6/2017    Knee ext strength R/L 4- / 4-   STS test 30" 6x with use of hands and WW  2 MWT 26' with WW, supervision <> contact guard assist WC following; 3 seated rest breaks       [3] Therapeutic exercise x 45'  3x STS with UE support, 20" supported standing, 1' seated rest   3x STS with UE support, 30" supported standing, 1' seated rest   3x STS with UE support, 40" supported standing, 1' seated rest   3x STS with UE support, 30" supported standing, 1' seated rest (only stood for 25")  3x STS with UE support, 20" supported standing, 1' seated rest   Gait x 5 steps with WW, 45" seated rest CGA with WC following behind - stopped at 4th set as patient had difficulty standing up straight.     Written HEP Provided: yes    Patient education: HEP; 20" work 40" rest with activities  Jesús demo good understanding of the education provided. Patient demo good return demo of skill of exercises.[id number]    Problem List: deconditioned, co-morbidities; LE weakness;       Assessment:    Jesús tolerated tx without increase in sxs. Increasing gait and standing activities to build tolerance. Instructed him and wife on HEP to continue progressing tolerance to being upright.    Jesús will benefit from skilled PT services to address these impairments and progress towards the below listed functional goals.  Jesús is in agreement with the goals that will be addressed in the treatment plan.Jesús demonstrates no additional cultural, spiritual or educational needs and currently has no barriers to learning.      Medical necessity: see Problem List      Functional Goals  Time frame     1.   The pt will stand unsupported with normal posture  for > 5' without increase in sxs.  ongoing  6 weeks     2.   The pt will tolerate standing frame > 20" without increase in sxs. Ongoing 10'   6 weeks     3.   The pt will ambulate > 20' with WW for improved household " ambulation. MET  6-8 weeks     4.   The pt will be independent in performance and progression of HEP. ongoing    2-3 visits     5.    The pt will ambulate safely / [I] up/down the flight up stairs in his home using BUE support for improved household ambulation.   6 weeks     6.    The pt will ambulate > 50' in 2 minutes with WW for improved household ambulation.   6 weeks                      Plan:      Proceed/Continue with POC.     Pt will be treated by physical therapy 1-2x/week during the certification period. Treatment will consist of therapeutic exercise, manual therapy, neuromuscular re-education, heat and cold modalities, electrical stimulation for pain relief and/or muscle activation, and any other types of treatment hat may be deemed medically necessary. Jesús may at times be seen by a PTA as part of the Rehab Team.       Physical Therapist: KRISTAL Bautista, PT, DPT, CSCS    I certify the need for these services furnished under this plan of treatment and while under my care.       ___________________________________  Physician/Referring Practitioner        _________________  Date of Signature

## 2017-02-08 ENCOUNTER — TELEPHONE (OUTPATIENT)
Dept: PODIATRY | Facility: CLINIC | Age: 72
End: 2017-02-08

## 2017-02-08 ENCOUNTER — HOSPITAL ENCOUNTER (OUTPATIENT)
Dept: RADIOLOGY | Facility: CLINIC | Age: 72
Discharge: HOME | End: 2017-02-08
Attending: INTERNAL MEDICINE
Payer: MEDICARE

## 2017-02-08 ENCOUNTER — PATIENT MESSAGE (OUTPATIENT)
Dept: PODIATRY | Facility: CLINIC | Age: 72
End: 2017-02-08

## 2017-02-08 DIAGNOSIS — S32.000S COMPRESSION FRACTURE OF LUMBAR VERTEBRA, SEQUELA: ICD-10-CM

## 2017-02-08 PROCEDURE — 77080 DXA BONE DENSITY AXIAL: CPT | Mod: TC

## 2017-02-08 PROCEDURE — 77080 DXA BONE DENSITY AXIAL: CPT | Mod: 26,,, | Performed by: INTERNAL MEDICINE

## 2017-02-08 NOTE — TELEPHONE ENCOUNTER
Spoke to pat and he said he need some nailcare, he is doing o.k. Was sick for a long time, scheduled his appt. To see Dr. Wilson this week Friday

## 2017-02-10 ENCOUNTER — OFFICE VISIT (OUTPATIENT)
Dept: PODIATRY | Facility: CLINIC | Age: 72
End: 2017-02-10
Payer: MEDICARE

## 2017-02-10 VITALS — DIASTOLIC BLOOD PRESSURE: 69 MMHG | SYSTOLIC BLOOD PRESSURE: 146 MMHG | HEIGHT: 74 IN | HEART RATE: 59 BPM

## 2017-02-10 DIAGNOSIS — E11.621 DIABETIC ULCER OF RIGHT FOOT ASSOCIATED WITH TYPE 2 DIABETES MELLITUS: ICD-10-CM

## 2017-02-10 DIAGNOSIS — Z89.432 S/P TRANSMETATARSAL AMPUTATION OF FOOT, LEFT: ICD-10-CM

## 2017-02-10 DIAGNOSIS — I73.9 PVD (PERIPHERAL VASCULAR DISEASE): ICD-10-CM

## 2017-02-10 DIAGNOSIS — L97.519 DIABETIC ULCER OF RIGHT FOOT ASSOCIATED WITH TYPE 2 DIABETES MELLITUS: ICD-10-CM

## 2017-02-10 DIAGNOSIS — B35.1 ONYCHOMYCOSIS DUE TO DERMATOPHYTE: ICD-10-CM

## 2017-02-10 DIAGNOSIS — E11.42 DIABETIC POLYNEUROPATHY ASSOCIATED WITH TYPE 2 DIABETES MELLITUS: Primary | ICD-10-CM

## 2017-02-10 PROCEDURE — 3045F PR MOST RECENT HEMOGLOBIN A1C LEVEL 7.0-9.0%: CPT | Mod: S$GLB,,, | Performed by: PODIATRIST

## 2017-02-10 PROCEDURE — 2022F DILAT RTA XM EVC RTNOPTHY: CPT | Mod: S$GLB,,, | Performed by: PODIATRIST

## 2017-02-10 PROCEDURE — 11720 DEBRIDE NAIL 1-5: CPT | Mod: 59,Q9,S$GLB, | Performed by: PODIATRIST

## 2017-02-10 PROCEDURE — 1126F AMNT PAIN NOTED NONE PRSNT: CPT | Mod: S$GLB,,, | Performed by: PODIATRIST

## 2017-02-10 PROCEDURE — 3078F DIAST BP <80 MM HG: CPT | Mod: S$GLB,,, | Performed by: PODIATRIST

## 2017-02-10 PROCEDURE — 99499 UNLISTED E&M SERVICE: CPT | Mod: S$GLB,,, | Performed by: PODIATRIST

## 2017-02-10 PROCEDURE — 99213 OFFICE O/P EST LOW 20 MIN: CPT | Mod: 25,S$GLB,, | Performed by: PODIATRIST

## 2017-02-10 PROCEDURE — 1159F MED LIST DOCD IN RCRD: CPT | Mod: S$GLB,,, | Performed by: PODIATRIST

## 2017-02-10 PROCEDURE — 3077F SYST BP >= 140 MM HG: CPT | Mod: S$GLB,,, | Performed by: PODIATRIST

## 2017-02-10 PROCEDURE — 1157F ADVNC CARE PLAN IN RCRD: CPT | Mod: S$GLB,,, | Performed by: PODIATRIST

## 2017-02-10 PROCEDURE — 3066F NEPHROPATHY DOC TX: CPT | Mod: S$GLB,,, | Performed by: PODIATRIST

## 2017-02-10 PROCEDURE — 11042 DBRDMT SUBQ TIS 1ST 20SQCM/<: CPT | Mod: S$GLB,,, | Performed by: PODIATRIST

## 2017-02-10 PROCEDURE — 99999 PR PBB SHADOW E&M-EST. PATIENT-LVL IV: CPT | Mod: PBBFAC,,, | Performed by: PODIATRIST

## 2017-02-10 PROCEDURE — 1160F RVW MEDS BY RX/DR IN RCRD: CPT | Mod: S$GLB,,, | Performed by: PODIATRIST

## 2017-02-10 NOTE — PROGRESS NOTES
Subjective:      Patient ID: Jesús Olvera is a 71 y.o. male.    Chief Complaint: Diabetes Mellitus (16 PCP Dr. oJiner); Diabetic Foot Exam; Routine Foot Care; Callouses; Foot Ulcer; and Follow-up    Jesús is a 71 y.o. male who presents to the clinic for evaluation and treatment of high risk feet. Jesús has a past medical history of Anemia associated with chronic renal failure; Arthritis; CAD (coronary atherosclerotic disease); CHF (congestive heart failure); -donor kidney transplant (2004); Diabetes mellitus; Encounter for blood transfusion; GIB (gastrointestinal bleeding) (10/23/2014); Neurogenic bladder; Osteomyelitis of lumbar vertebra (10/20/2014); PVD (peripheral vascular disease); Renal hypertension; Secondary hyperparathyroidism of renal origin; Secondary myopathy; and Thyroid nodule (2016). The patient's chief complaint is diabetic foot ulcer, R foot. This patient has documented high risk feet requiring routine maintenance secondary to diabetes mellitis and those secondary complications of diabetes, as mentioned. He noted this a few months ago at tip of 2nd toe. Denies injury. He has been applying neosporin daily. Denies drainage or swelling at toe.    PCP: Dante Joiner MD    Date Last Seen by PCP:   Chief Complaint   Patient presents with    Diabetes Mellitus     16 PCP Dr. Joiner    Diabetic Foot Exam    Routine Foot Care    Callouses    Foot Ulcer    Follow-up         Current shoe gear:  Affected Foot: Rx diabetic extra depth shoes and custom accommodative insoles     Unaffected Foot: Rx diabetic extra depth shoes and custom accommodative insoles    History of Trauma: negative  Sign of Infection: none    Hemoglobin A1C   Date Value Ref Range Status   06/10/2016 7.7 (H) 4.5 - 6.2 % Final   10/17/2014 7.9 (H) 4.5 - 6.2 % Final   2012 8.7 (H) 4.0 - 6.2 % Final         Review of Systems   Constitution: Negative for chills, fever and night sweats.    Cardiovascular: Positive for leg swelling. Negative for chest pain.   Respiratory: Negative for shortness of breath.    Skin: Positive for poor wound healing.   Musculoskeletal: Negative for joint pain and joint swelling.        L foot partial amputation.    Gastrointestinal: Negative for diarrhea, nausea and vomiting.   Neurological: Positive for numbness and paresthesias.         Objective:      Physical Exam   Constitutional: He is oriented to person, place, and time. He appears well-developed and well-nourished. No distress.   Cardiovascular: Normal rate and intact distal pulses.    Musculoskeletal: Normal range of motion. He exhibits edema (Mild). He exhibits no tenderness.   L transmetatarsal amputation    Neurological: He is alert and oriented to person, place, and time. He exhibits normal muscle tone.   Neurologic: Sharp/dull sensation absent L foot.   Skin: Skin is warm and dry. No rash noted. He is not diaphoretic. No erythema. No pallor.   Ulcer Location: distal 2nd toe  Measurements: 1.2x 0.7 x 0.1 cm  Periwound: Intact  Drainage: none  Pus: None.  Malodor: None.  Base:  Fibro ganular  Signs of infection: None.     Toenails 1-5, right are elongated, thickened by 2-3 mm, discolored/yellowed, dystrophic, brittle with subungual debris.     Psychiatric: He has a normal mood and affect. His behavior is normal. Judgment and thought content normal.   Nursing note and vitals reviewed.          Assessment:       Encounter Diagnoses   Name Primary?    Diabetic polyneuropathy associated with type 2 diabetes mellitus Yes    PVD (peripheral vascular disease)     Diabetic ulcer of right foot associated with type 2 diabetes mellitus     S/P transmetatarsal amputation of foot, left     Onychomycosis due to dermatophyte    This patient has documented high risk feet requiring routine maintenance secondary to diabetes mellitis and those secondary complications of diabetes, as mentioned.        Plan:       Jesús was  seen today for diabetes mellitus, diabetic foot exam, routine foot care, callouses, foot ulcer and follow-up.    Diagnoses and all orders for this visit:    Diabetic polyneuropathy associated with type 2 diabetes mellitus    PVD (peripheral vascular disease)    Diabetic ulcer of right foot associated with type 2 diabetes mellitus    S/P transmetatarsal amputation of foot, left    Onychomycosis due to dermatophyte    - Patient was given written and verbal instructions regarding foot condition.  I counseled the patient on his conditions, their implications and medical management.    - The wound is cleansed as much as possible and dressed with Kalee and Triple Juan Antonio Foam. The patient is alerted to watch for any signs of infection (redness, pus, pain, increased swelling or fever) and call if such occurs. Home wound care instructions are provided.     - With patient's permission, Shawna Shah MA assisted me with the application of a football dressing R foot under my direct supervision. Darco shoe applied and patient instructed to never walk without the Darco shoe on the foot.     - Obtained verbal consent from the patient for excisional wound debridement, right 2nd toe. No anesthesia was required, Utilizing a sterile curet, all non-viable soft tissue was excised to level of health subcutaneous tissue. A healthy appearing wound base was achieved with minimal blood loss. Hemostasis achieved with compression. Wound site was irrigated with normal saline and dressed. Wound care instructions were reviewed with the patient. Patient tolerated the procedure well.    Shoe inspection. Diabetic Foot Education. Patient reminded of the importance of good nutrition and blood sugar control to help prevent podiatric complications of diabetes. Patient instructed on proper foot hygeine. We discussed wearing proper shoe gear, daily foot inspections, never walking without protective shoe gear, never putting sharp instruments to  feet    - With patient's permission, nails were aggressively reduced and debrided x 5 to their soft tissue attachment mechanically and with electric , removing all offending nail and debris. Patient relates relief following the procedure. She will continue to monitor the areas daily, inspect her feet, wear protective shoe gear when ambulatory, moisturizer to maintain skin integrity and follow in this office in approximately 2-3 months, sooner p.r.n.      Return in about 2 weeks (around 2/24/2017).

## 2017-02-21 ENCOUNTER — CLINICAL SUPPORT (OUTPATIENT)
Dept: REHABILITATION | Facility: HOSPITAL | Age: 72
End: 2017-02-21
Attending: NEUROLOGICAL SURGERY
Payer: MEDICARE

## 2017-02-21 DIAGNOSIS — R26.9 GAIT DIFFICULTY: ICD-10-CM

## 2017-02-21 PROCEDURE — 97110 THERAPEUTIC EXERCISES: CPT | Mod: PO

## 2017-02-21 NOTE — PROGRESS NOTES
"Name: Jesús Olvera  [facility] Number: [id number]  2017.     Diagnosis: s/p lumbar fusion   Physician: Ugo Saha MD  Treatment Orders: PT Eval and Treat    Past Medical History   Diagnosis Date    Anemia associated with chronic renal failure     Arthritis     CAD (coronary atherosclerotic disease)     CHF (congestive heart failure)     -donor kidney transplant 2004    Diabetes mellitus     Encounter for blood transfusion     GIB (gastrointestinal bleeding) 10/23/2014    Neurogenic bladder     Osteomyelitis of lumbar vertebra 10/20/2014    PVD (peripheral vascular disease)      Heel ulcers with osteomyelitis, subsequent hemipedectomy on the left      Renal hypertension     Secondary hyperparathyroidism of renal origin     Secondary myopathy      steriod induced    Thyroid nodule 2016     Current Outpatient Prescriptions   Medication Sig    amlodipine (NORVASC) 10 MG tablet Take 10 mg by mouth once daily.      aspirin 81 MG Chew Take 81 mg by mouth every evening.     BD INSULIN SYRINGE ULTRA-FINE 1/2 mL 31 x 5/16" Syrg     carvedilol (COREG) 25 MG tablet Take 2 tablets (50 mg total) by mouth 2 (two) times daily with meals.    cholecalciferol, vitamin D3, 1,000 unit capsule Take 2 capsules (2,000 Units total) by mouth once daily.    dorzolamide-timolol 2-0.5% (COSOPT) 22.3-6.8 mg/mL ophthalmic solution     ferrous sulfate 325 (65 FE) MG EC tablet Take 1 tablet (325 mg total) by mouth once daily.    gabapentin (NEURONTIN) 300 MG capsule Take 300 mg by mouth 3 (three) times daily.    hydrALAZINE (APRESOLINE) 10 MG tablet Take 1 tablet (10 mg total) by mouth every 12 (twelve) hours.    insulin aspart (NOVOLOG) 100 unit/mL injection Inject 10 Units into the skin 3 (three) times daily with meals. (Patient taking differently: Inject 10 Units into the skin 3 (three) times daily with meals. Pt stated that he is taking 13 units 3 times daily w/ meals. CF)    LANTUS 100 unit/mL " injection Inject 20 Units into the skin 2 (two) times daily.    LUMIGAN 0.01 % Drop Place 1 drop into both eyes every evening.     multivitamin capsule Take 1 capsule by mouth once daily.    mycophenolate (CELLCEPT) 250 mg Cap Take 2 capsules (500 mg total) by mouth 2 (two) times daily.    omeprazole (PRILOSEC) 20 MG capsule Take 2 capsules (40 mg total) by mouth once daily.    oxycodone-acetaminophen (LYNOX) 7.5-300 mg per tablet Take 1 tablet by mouth every 6 (six) hours as needed for Pain.    oxycodone-acetaminophen (PERCOCET) 7.5-325 mg per tablet Take 1 tablet by mouth every 4 (four) hours as needed for Pain.    predniSONE (DELTASONE) 5 MG tablet Take 5 mg by mouth once daily.      senna-docusate 8.6-50 mg (PERICOLACE) 8.6-50 mg per tablet Take 1 tablet by mouth 2 (two) times daily as needed for Constipation.    simvastatin (ZOCOR) 40 MG tablet 40 mg every evening.     sodium bicarbonate 650 MG tablet TAKE 1 TABLET (650 MG TOTAL) BY MOUTH ONCE DAILY.    tacrolimus (PROGRAF) 1 MG Cap Take 2 capsules (2 mg total) by mouth every 12 (twelve) hours.    tamsulosin (FLOMAX) 0.4 mg Cp24 take 1 capsule by mouth once daily    valsartan (DIOVAN) 160 MG tablet Take 160 mg by mouth once daily.    vitamin renal formula, B-complex-vitamin c-folic acid, (TRIPHROCAPS) 1 mg Cap Take 1 capsule by mouth once daily.     No current facility-administered medications for this visit.      Review of patient's allergies indicates:  No Known Allergies  Precautions: fall; back brace 2/2 hardware failure of previous lumbar fusion       Subjective:      Jesús reports was told had a 95% chance of something going wrong if he was to get a fusion to l/s. Will not be doing it, and will work on getting stronger with PT.     Pain is rated on a 0-10 scale with 0 being no pain and 10 being pain requiring emergency room visit.    Diagnostic Tests: Radiographs    Patient Goals for PT: standing, walking with AD      Objective:    Visit #  "16  Time In: 1300  Time Out: 1330  Treatment time: 30'   Date of eval/re-eval: 2/6/2017    Knee ext strength R/L 4- / 4-   STS test 30" 6x with use of hands and WW  2 MWT 26' with WW, supervision <> contact guard assist WC following; 3 seated rest breaks       [2] Therapeutic exercise x 30'  LAQ 10" x 15 R/L   Seated trunk rotations pink cook band 3 x 5  Seated cane flexion with anti-rotation 3 x 5  5 sets - 5x STS, 20" supported standing, 1' rest ; 18" last set  5 sets of Gait x 7 steps with WW, 45" seated rest CGA with WC following behind    Written HEP Provided: yes    Patient education: HEP; 20" work 40" rest with activities  Jesús demo good understanding of the education provided. Patient demo good return demo of skill of exercises.[id number]    Problem List: deconditioned, co-morbidities; LE weakness;       Assessment:    Jesús tolerated tx without increase in sxs. Increasing gait distances.    Jesús will benefit from skilled PT services to address these impairments and progress towards the below listed functional goals.  Jesús is in agreement with the goals that will be addressed in the treatment plan.Jesús demonstrates no additional cultural, spiritual or educational needs and currently has no barriers to learning.      Medical necessity: see Problem List      Functional Goals  Time frame     1.   The pt will stand unsupported with normal posture  for > 5' without increase in sxs.  ongoing  6 weeks     2.   The pt will tolerate standing frame > 20" without increase in sxs. Ongoing 10'   6 weeks     3.   The pt will ambulate > 20' with WW for improved household ambulation. MET  6-8 weeks     4.   The pt will be independent in performance and progression of HEP. ongoing    2-3 visits     5.    The pt will ambulate safely / [I] up/down the flight up stairs in his home using BUE support for improved household ambulation.   6 weeks     6.    The pt will ambulate > 50' in 2 minutes with WW for improved household " ambulation.   6 weeks                      Plan:      Proceed/Continue with POC.     Pt will be treated by physical therapy 1-2x/week during the certification period. Treatment will consist of therapeutic exercise, manual therapy, neuromuscular re-education, heat and cold modalities, electrical stimulation for pain relief and/or muscle activation, and any other types of treatment hat may be deemed medically necessary. Jesús may at times be seen by a PTA as part of the Rehab Team.       Physical Therapist: KRISTAL Bautista, PT, DPT, CSCS    I certify the need for these services furnished under this plan of treatment and while under my care.       ___________________________________  Physician/Referring Practitioner        _________________  Date of Signature

## 2017-02-23 PROBLEM — M81.0 OSTEOPOROSIS: Status: ACTIVE | Noted: 2017-02-23

## 2017-02-24 ENCOUNTER — OFFICE VISIT (OUTPATIENT)
Dept: PODIATRY | Facility: CLINIC | Age: 72
End: 2017-02-24
Payer: MEDICARE

## 2017-02-24 VITALS — HEART RATE: 69 BPM | HEIGHT: 74 IN | DIASTOLIC BLOOD PRESSURE: 59 MMHG | SYSTOLIC BLOOD PRESSURE: 129 MMHG

## 2017-02-24 DIAGNOSIS — I73.9 PVD (PERIPHERAL VASCULAR DISEASE): ICD-10-CM

## 2017-02-24 DIAGNOSIS — E11.621 DIABETIC ULCER OF RIGHT FOOT ASSOCIATED WITH TYPE 2 DIABETES MELLITUS: ICD-10-CM

## 2017-02-24 DIAGNOSIS — L97.519 DIABETIC ULCER OF RIGHT FOOT ASSOCIATED WITH TYPE 2 DIABETES MELLITUS: ICD-10-CM

## 2017-02-24 DIAGNOSIS — Z89.432 S/P TRANSMETATARSAL AMPUTATION OF FOOT, LEFT: ICD-10-CM

## 2017-02-24 DIAGNOSIS — E11.42 DIABETIC POLYNEUROPATHY ASSOCIATED WITH TYPE 2 DIABETES MELLITUS: Primary | ICD-10-CM

## 2017-02-24 PROCEDURE — 99499 UNLISTED E&M SERVICE: CPT | Mod: S$GLB,,, | Performed by: PODIATRIST

## 2017-02-24 PROCEDURE — 11042 DBRDMT SUBQ TIS 1ST 20SQCM/<: CPT | Mod: S$GLB,,, | Performed by: PODIATRIST

## 2017-02-24 PROCEDURE — 99999 PR PBB SHADOW E&M-EST. PATIENT-LVL III: CPT | Mod: PBBFAC,,, | Performed by: PODIATRIST

## 2017-02-24 NOTE — PROGRESS NOTES
Subjective:      Patient ID: Jesús Olvera is a 71 y.o. male.    Chief Complaint: Foot Ulcer (PCP Dr. Joiner 16); Follow-up; Dressing Change; and Wound Care    Jesús is a 71 y.o. male who presents to the clinic for evaluation and treatment of high risk feet. Jesús has a past medical history of Anemia associated with chronic renal failure; Arthritis; CAD (coronary atherosclerotic disease); CHF (congestive heart failure); -donor kidney transplant (2004); Diabetes mellitus; Encounter for blood transfusion; GIB (gastrointestinal bleeding) (10/23/2014); Neurogenic bladder; Osteomyelitis of lumbar vertebra (10/20/2014); PVD (peripheral vascular disease); Renal hypertension; Secondary hyperparathyroidism of renal origin; Secondary myopathy; and Thyroid nodule (2016). The patient's chief complaint is diabetic foot ulcer, R foot. This patient has documented high risk feet requiring routine maintenance secondary to diabetes mellitis and those secondary complications of diabetes, as mentioned. He noted this a few months ago at tip of 2nd toe. Denies injury. He has been applying neosporin daily. Denies drainage or swelling at toe.    PCP: Dante Joiner MD    Date Last Seen by PCP:   Chief Complaint   Patient presents with    Foot Ulcer     PCP Dr. Joiner 16    Follow-up    Dressing Change    Wound Care         Current shoe gear:  Affected Foot: Rx diabetic extra depth shoes and custom accommodative insoles     Unaffected Foot: Rx diabetic extra depth shoes and custom accommodative insoles    History of Trauma: negative  Sign of Infection: none    Hemoglobin A1C   Date Value Ref Range Status   06/10/2016 7.7 (H) 4.5 - 6.2 % Final   10/17/2014 7.9 (H) 4.5 - 6.2 % Final   2012 8.7 (H) 4.0 - 6.2 % Final         Review of Systems   Constitution: Negative for chills, fever and night sweats.   Cardiovascular: Positive for leg swelling. Negative for chest pain.   Respiratory:  Negative for shortness of breath.    Skin: Positive for poor wound healing.   Musculoskeletal: Negative for joint pain and joint swelling.        L foot partial amputation.    Gastrointestinal: Negative for diarrhea, nausea and vomiting.   Neurological: Positive for numbness and paresthesias.         Objective:      Physical Exam   Constitutional: He is oriented to person, place, and time. He appears well-developed and well-nourished. No distress.   Cardiovascular: Normal rate and intact distal pulses.    Musculoskeletal: Normal range of motion. He exhibits edema (Mild). He exhibits no tenderness.   L transmetatarsal amputation    Neurological: He is alert and oriented to person, place, and time. He exhibits normal muscle tone.   Neurologic: Sharp/dull sensation absent L foot.   Skin: Skin is warm and dry. No rash noted. He is not diaphoretic. No erythema. No pallor.   Ulcer Location: distal 2nd toe  Measurements: 0.7 x 0.7 x 0.1 cm  Periwound: Intact  Drainage: none  Pus: None.  Malodor: None.  Base:  Fibro ganular  Signs of infection: None.     Toenails 1-5, right are elongated, thickened by 2-3 mm, discolored/yellowed, dystrophic, brittle with subungual debris.     Psychiatric: He has a normal mood and affect. His behavior is normal. Judgment and thought content normal.   Nursing note and vitals reviewed.          Assessment:       Encounter Diagnoses   Name Primary?    Diabetic polyneuropathy associated with type 2 diabetes mellitus Yes    PVD (peripheral vascular disease)    This patient has documented high risk feet requiring routine maintenance secondary to diabetes mellitis and those secondary complications of diabetes, as mentioned.        Plan:       Jesús was seen today for foot ulcer, follow-up, dressing change and wound care.    Diagnoses and all orders for this visit:    Diabetic polyneuropathy associated with type 2 diabetes mellitus    PVD (peripheral vascular disease)    - Patient was given  written and verbal instructions regarding foot condition.  I counseled the patient on his conditions, their implications and medical management.    - The wound is cleansed as much as possible and dressed with Kalee and Triple Juan Antonio Foam. The patient is alerted to watch for any signs of infection (redness, pus, pain, increased swelling or fever) and call if such occurs. Home wound care instructions are provided.     - With patient's permission, Shawna Shah MA assisted me with the application of a football dressing R foot under my direct supervision. Darco shoe applied and patient instructed to never walk without the Darco shoe on the foot.     - Obtained verbal consent from the patient for excisional wound debridement, right 2nd toe. No anesthesia was required, Utilizing a sterile curet, all non-viable soft tissue was excised to level of health subcutaneous tissue. A healthy appearing wound base was achieved with minimal blood loss. Hemostasis achieved with compression. Wound site was irrigated with normal saline and dressed. Wound care instructions were reviewed with the patient. Patient tolerated the procedure well.    Shoe inspection. Diabetic Foot Education. Patient reminded of the importance of good nutrition and blood sugar control to help prevent podiatric complications of diabetes. Patient instructed on proper foot hygeine. We discussed wearing proper shoe gear, daily foot inspections, never walking without protective shoe gear, never putting sharp instruments to feet    No Follow-up on file.

## 2017-03-02 ENCOUNTER — CLINICAL SUPPORT (OUTPATIENT)
Dept: REHABILITATION | Facility: HOSPITAL | Age: 72
End: 2017-03-02
Attending: NEUROLOGICAL SURGERY
Payer: MEDICARE

## 2017-03-02 DIAGNOSIS — R26.9 GAIT DIFFICULTY: ICD-10-CM

## 2017-03-02 PROCEDURE — 97110 THERAPEUTIC EXERCISES: CPT | Mod: PO

## 2017-03-02 NOTE — PROGRESS NOTES
"Name: Jesús Olvera  Clinic Number: [id number]  2017.     Diagnosis: s/p lumbar fusion   Physician: Ugo Saha MD  Treatment Orders: PT Eval and Treat    Past Medical History:   Diagnosis Date    Anemia associated with chronic renal failure     Arthritis     CAD (coronary atherosclerotic disease)     CHF (congestive heart failure)     -donor kidney transplant 2004    Diabetes mellitus     Encounter for blood transfusion     GIB (gastrointestinal bleeding) 10/23/2014    Neurogenic bladder     Osteomyelitis of lumbar vertebra 10/20/2014    PVD (peripheral vascular disease)     Heel ulcers with osteomyelitis, subsequent hemipedectomy on the left      Renal hypertension     Secondary hyperparathyroidism of renal origin     Secondary myopathy     steriod induced    Thyroid nodule 2016     Current Outpatient Prescriptions   Medication Sig    amlodipine (NORVASC) 10 MG tablet Take 10 mg by mouth once daily.      aspirin 81 MG Chew Take 81 mg by mouth every evening.     BD INSULIN SYRINGE ULTRA-FINE 1/2 mL 31 x 5/16" Syrg     carvedilol (COREG) 25 MG tablet Take 2 tablets (50 mg total) by mouth 2 (two) times daily with meals.    cholecalciferol, vitamin D3, 1,000 unit capsule Take 2 capsules (2,000 Units total) by mouth once daily.    dorzolamide-timolol 2-0.5% (COSOPT) 22.3-6.8 mg/mL ophthalmic solution     ergocalciferol (ERGOCALCIFEROL) 50,000 unit Cap Take 1 capsule (50,000 Units total) by mouth every 7 days.    ferrous sulfate 325 (65 FE) MG EC tablet Take 1 tablet (325 mg total) by mouth once daily.    gabapentin (NEURONTIN) 300 MG capsule Take 300 mg by mouth 3 (three) times daily.    hydrALAZINE (APRESOLINE) 10 MG tablet Take 1 tablet (10 mg total) by mouth every 12 (twelve) hours.    insulin aspart (NOVOLOG) 100 unit/mL injection Inject 10 Units into the skin 3 (three) times daily with meals. (Patient taking differently: Inject 10 Units into the skin 3 (three) " times daily with meals. Pt stated that he is taking 13 units 3 times daily w/ meals. CF)    LANTUS 100 unit/mL injection Inject 20 Units into the skin 2 (two) times daily.    LUMIGAN 0.01 % Drop Place 1 drop into both eyes every evening.     multivitamin capsule Take 1 capsule by mouth once daily.    mycophenolate (CELLCEPT) 250 mg Cap Take 2 capsules (500 mg total) by mouth 2 (two) times daily.    omeprazole (PRILOSEC) 20 MG capsule Take 2 capsules (40 mg total) by mouth once daily.    oxycodone-acetaminophen (LYNOX) 7.5-300 mg per tablet Take 1 tablet by mouth every 6 (six) hours as needed for Pain.    oxycodone-acetaminophen (PERCOCET) 7.5-325 mg per tablet Take 1 tablet by mouth every 4 (four) hours as needed for Pain.    predniSONE (DELTASONE) 5 MG tablet Take 5 mg by mouth once daily.      senna-docusate 8.6-50 mg (PERICOLACE) 8.6-50 mg per tablet Take 1 tablet by mouth 2 (two) times daily as needed for Constipation.    simvastatin (ZOCOR) 40 MG tablet 40 mg every evening.     sodium bicarbonate 650 MG tablet TAKE 1 TABLET (650 MG TOTAL) BY MOUTH ONCE DAILY.    tacrolimus (PROGRAF) 1 MG Cap Take 2 capsules (2 mg total) by mouth every 12 (twelve) hours.    tamsulosin (FLOMAX) 0.4 mg Cp24 take 1 capsule by mouth once daily    valsartan (DIOVAN) 160 MG tablet Take 160 mg by mouth once daily.    vitamin renal formula, B-complex-vitamin c-folic acid, (TRIPHROCAPS) 1 mg Cap Take 1 capsule by mouth once daily.     No current facility-administered medications for this visit.      Review of patient's allergies indicates:  No Known Allergies  Precautions: fall; back brace 2/2 hardware failure of previous lumbar fusion       Subjective:      Jesús reports did his bike for 20' and then tried to go up stairs 2x for exercise. After he felt both quads weak and had trouble standing. Continues to feel weakness in his quads today.     Pain is rated on a 0-10 scale with 0 being no pain and 10 being pain requiring  "emergency room visit.    Diagnostic Tests: Radiographs    Patient Goals for PT: standing, walking with AD      Objective:    Visit # 17  Time In: 1100  Time Out: 1145  Treatment time: 30'   Date of eval/re-eval: 2/6/2017    Knee ext strength R/L 4- / 4-   STS test 30" 6x with use of hands and WW  2 MWT 26' with WW, supervision <> contact guard assist WC following; 3 seated rest breaks       [2] Therapeutic exercise x 30'  LAQ 10" x 15 R/L   Seated D2 flexion with stick/2 # ankle weigh 2 x 10 and glute isometrics   Wall prop 2 x 10" unable to complete 2/2 quad weakness    --np--  Seated trunk rotations pink cook band 3 x 5  Seated cane flexion with anti-rotation 3 x 5  5 sets - 5x STS, 20" supported standing, 1' rest ; 18" last set  5 sets of Gait x 7 steps with WW, 45" seated rest CGA with WC following behind    Written HEP Provided: yes    Patient education: HEP; 20" work 40" rest with activities  Jesús demo good understanding of the education provided. Patient demo good return demo of skill of exercises.1315    Problem List: deconditioned, co-morbidities; LE weakness;       Assessment:    Jesús tolerated tx without increase in sxs. Difficulty with standing balance today. Instructed to rest today and try his bike and stair climbing tomorrow. Needs additional recovery time due to his level of deconditioning.  Jesús demonstrates no additional cultural, spiritual or educational needs and currently has no barriers to learning.      Medical necessity: see Problem List      Functional Goals  Time frame     1.   The pt will stand unsupported with normal posture  for > 5' without increase in sxs.  ongoing  6 weeks     2.   The pt will tolerate standing frame > 20" without increase in sxs. Ongoing 10'   6 weeks     3.   The pt will ambulate > 20' with WW for improved household ambulation. MET  6-8 weeks     4.   The pt will be independent in performance and progression of HEP. ongoing    2-3 visits     5.    The pt will " ambulate safely / [I] up/down the flight up stairs in his home using BUE support for improved household ambulation.   6 weeks     6.    The pt will ambulate > 50' in 2 minutes with WW for improved household ambulation.   6 weeks                      Plan:      Proceed/Continue with POC.     Pt will be treated by physical therapy 1-2x/week during the certification period. Treatment will consist of therapeutic exercise, manual therapy, neuromuscular re-education, heat and cold modalities, electrical stimulation for pain relief and/or muscle activation, and any other types of treatment hat may be deemed medically necessary. Jesús may at times be seen by a PTA as part of the Rehab Team.       Physical Therapist: KRISTAL Bautista, PT, DPT, CSCS    I certify the need for these services furnished under this plan of treatment and while under my care.       ___________________________________  Physician/Referring Practitioner        _________________  Date of Signature

## 2017-03-06 ENCOUNTER — CLINICAL SUPPORT (OUTPATIENT)
Dept: REHABILITATION | Facility: HOSPITAL | Age: 72
End: 2017-03-06
Attending: NEUROLOGICAL SURGERY
Payer: MEDICARE

## 2017-03-06 DIAGNOSIS — R26.9 GAIT DIFFICULTY: ICD-10-CM

## 2017-03-06 PROCEDURE — 97110 THERAPEUTIC EXERCISES: CPT | Mod: PO

## 2017-03-06 NOTE — PROGRESS NOTES
"Name: Jesús Olvera  Clinic Number: [id number]  2017.     Diagnosis: s/p lumbar fusion   Physician: Ugo Saha MD  Treatment Orders: PT Eval and Treat    Past Medical History:   Diagnosis Date    Anemia associated with chronic renal failure     Arthritis     CAD (coronary atherosclerotic disease)     CHF (congestive heart failure)     -donor kidney transplant 2004    Diabetes mellitus     Encounter for blood transfusion     GIB (gastrointestinal bleeding) 10/23/2014    Neurogenic bladder     Osteomyelitis of lumbar vertebra 10/20/2014    PVD (peripheral vascular disease)     Heel ulcers with osteomyelitis, subsequent hemipedectomy on the left      Renal hypertension     Secondary hyperparathyroidism of renal origin     Secondary myopathy     steriod induced    Thyroid nodule 2016     Current Outpatient Prescriptions   Medication Sig    amlodipine (NORVASC) 10 MG tablet Take 10 mg by mouth once daily.      aspirin 81 MG Chew Take 81 mg by mouth every evening.     BD INSULIN SYRINGE ULTRA-FINE 1/2 mL 31 x 5/16" Syrg     carvedilol (COREG) 25 MG tablet Take 2 tablets (50 mg total) by mouth 2 (two) times daily with meals.    cholecalciferol, vitamin D3, 1,000 unit capsule Take 2 capsules (2,000 Units total) by mouth once daily.    dorzolamide-timolol 2-0.5% (COSOPT) 22.3-6.8 mg/mL ophthalmic solution     ergocalciferol (ERGOCALCIFEROL) 50,000 unit Cap Take 1 capsule (50,000 Units total) by mouth every 7 days.    ferrous sulfate 325 (65 FE) MG EC tablet Take 1 tablet (325 mg total) by mouth once daily.    gabapentin (NEURONTIN) 300 MG capsule Take 300 mg by mouth 3 (three) times daily.    hydrALAZINE (APRESOLINE) 10 MG tablet Take 1 tablet (10 mg total) by mouth every 12 (twelve) hours.    insulin aspart (NOVOLOG) 100 unit/mL injection Inject 10 Units into the skin 3 (three) times daily with meals. (Patient taking differently: Inject 10 Units into the skin 3 (three) " times daily with meals. Pt stated that he is taking 13 units 3 times daily w/ meals. CF)    LANTUS 100 unit/mL injection Inject 20 Units into the skin 2 (two) times daily.    LUMIGAN 0.01 % Drop Place 1 drop into both eyes every evening.     multivitamin capsule Take 1 capsule by mouth once daily.    mycophenolate (CELLCEPT) 250 mg Cap Take 2 capsules (500 mg total) by mouth 2 (two) times daily.    omeprazole (PRILOSEC) 20 MG capsule Take 2 capsules (40 mg total) by mouth once daily.    oxycodone-acetaminophen (LYNOX) 7.5-300 mg per tablet Take 1 tablet by mouth every 6 (six) hours as needed for Pain.    oxycodone-acetaminophen (PERCOCET) 7.5-325 mg per tablet Take 1 tablet by mouth every 4 (four) hours as needed for Pain.    predniSONE (DELTASONE) 5 MG tablet Take 5 mg by mouth once daily.      senna-docusate 8.6-50 mg (PERICOLACE) 8.6-50 mg per tablet Take 1 tablet by mouth 2 (two) times daily as needed for Constipation.    simvastatin (ZOCOR) 40 MG tablet 40 mg every evening.     sodium bicarbonate 650 MG tablet TAKE 1 TABLET (650 MG TOTAL) BY MOUTH ONCE DAILY.    tacrolimus (PROGRAF) 1 MG Cap Take 2 capsules (2 mg total) by mouth every 12 (twelve) hours.    tamsulosin (FLOMAX) 0.4 mg Cp24 take 1 capsule by mouth once daily    valsartan (DIOVAN) 160 MG tablet Take 160 mg by mouth once daily.    vitamin renal formula, B-complex-vitamin c-folic acid, (TRIPHROCAPS) 1 mg Cap Take 1 capsule by mouth once daily.     No current facility-administered medications for this visit.      Review of patient's allergies indicates:  No Known Allergies  Precautions: fall; back brace 2/2 hardware failure of previous lumbar fusion       Subjective:      Jesús reports feeling better today. Been riding his bike 3x day for 15'.     Pain is rated on a 0-10 scale with 0 being no pain and 10 being pain requiring emergency room visit.    Diagnostic Tests: Radiographs    Patient Goals for PT: standing, walking with  "AD      Objective:    Visit # 18  Time In: 1100  Time Out: 1145  Treatment time: 30'   Date of eval/re-eval: 3/6/2017    Knee ext strength R/L 4- / 4-   STS test 30" 6x with use of hands and WW      2 MWT 46' with WW, supervision, WC following; 1 seated rest breaks  Previous 2 MWT 26' with WW, supervision <> contact guard assist WC following; 3 seated rest breaks     [2] Therapeutic exercise x 30'  LAQ 3 x 12 4#   Seated D2 flexion with stick/ 3# 3 x 8 and glute isometrics   Gait ~ 46' x 4 w/ WW, supervision, WC following; 2-3' rest between sets   Wall prop 2 x 10" unable to complete 2/2 quad weakness    --np--  Seated trunk rotations pink cook band 3 x 5  Seated cane flexion with anti-rotation 3 x 5  5 sets - 5x STS, 20" supported standing, 1' rest ; 18" last set  5 sets of Gait x 7 steps with WW, 45" seated rest CGA with WC following behind    Written HEP Provided: yes    Patient education: gait training at home with rollator walker.   Jesús demo good understanding of the education provided. Patient demo good return demo of skill of exercises.1315    Problem List: deconditioned, co-morbidities; LE weakness;       Assessment:    Jesús tolerated tx without increase in sxs. Tremendous gait improvements. Better posture and more patient. Conditioning and strength improving.   Jesús demonstrates no additional cultural, spiritual or educational needs and currently has no barriers to learning.  Gait goals updated.     Medical necessity: see Problem List      Functional Goals  Time frame     1.   The pt will stand unsupported with normal posture  for > 5' without increase in sxs.  ongoing  6 weeks     2.   The pt will tolerate standing frame > 20" without increase in sxs. Ongoing 10'   6 weeks     3.   The pt will ambulate > 20' with WW for improved household ambulation. MET  6-8 weeks     4.   The pt will be independent in performance and progression of HEP. ongoing    2-3 visits     5.    The pt will ambulate safely / " [I] up/down the flight up stairs in his home using BUE support for improved household ambulation.   6 weeks     6.    The pt will ambulate > 50' in 2 minutes with WW for improved household ambulation. 46' MET  6 weeks     7.   The pt will ambulate > 100' in 2 minutes with WW for improved household ambulation.   6 - 8 weeks     8.  The pt will ambulate > 300' in 6 minutes with WW for improved community ambulation.   8 weeks            Plan:      Proceed/Continue with POC.     Pt will be treated by physical therapy 1-2x/week during the certification period. Treatment will consist of therapeutic exercise, manual therapy, neuromuscular re-education, heat and cold modalities, electrical stimulation for pain relief and/or muscle activation, and any other types of treatment hat may be deemed medically necessary. Jesús may at times be seen by a PTA as part of the Rehab Team.       Physical Therapist: KRISTAL Bautista, PT, DPT, CSCS    I certify the need for these services furnished under this plan of treatment and while under my care.       ___________________________________  Physician/Referring Practitioner        _________________  Date of Signature

## 2017-03-08 ENCOUNTER — TELEPHONE (OUTPATIENT)
Dept: NEPHROLOGY | Facility: CLINIC | Age: 72
End: 2017-03-08

## 2017-03-08 NOTE — TELEPHONE ENCOUNTER
----- Message from Fabiana Nichols sent at 3/8/2017  1:52 PM CST -----  Contact: self  Pt called in about wanting to get refill on medication. Pt needs refill on medication:tacrolimus (PROGRAF) 1 MG Cap generic brand. Please refill medication and let pt know when prescription has been refilled. Please send the pre authorization for medication.        Pt can be reached at [phone]      Thank You      Called pt no answer I also called Anna with [email or web address] with no reply

## 2017-03-08 NOTE — TELEPHONE ENCOUNTER
----- Message from Bhupendra Hermosillo sent at 3/8/2017 10:01 AM CST -----  Contact: [employer] [facility] is calling in to get in touch with the nurse regarding a prior authorization form for some meds .     Izabella contact Ms Castillo for more information needed at [phone]    Thanks     Spoke to mandy from [facility] and she faxed the prior auth form to the wrong dept but stated that she will fax it to me @[phone] ,I spoke to pt and explained everything that's going on and explained that form will be filled out tomorrow when I make it back to main campus/pt verbalized understanding

## 2017-03-10 ENCOUNTER — OFFICE VISIT (OUTPATIENT)
Dept: PODIATRY | Facility: CLINIC | Age: 72
End: 2017-03-10
Payer: MEDICARE

## 2017-03-10 VITALS — HEIGHT: 74 IN | DIASTOLIC BLOOD PRESSURE: 63 MMHG | HEART RATE: 68 BPM | SYSTOLIC BLOOD PRESSURE: 149 MMHG

## 2017-03-10 DIAGNOSIS — Z89.432 S/P TRANSMETATARSAL AMPUTATION OF FOOT, LEFT: ICD-10-CM

## 2017-03-10 DIAGNOSIS — I73.9 PVD (PERIPHERAL VASCULAR DISEASE): ICD-10-CM

## 2017-03-10 DIAGNOSIS — E11.621 DIABETIC ULCER OF RIGHT FOOT ASSOCIATED WITH TYPE 2 DIABETES MELLITUS: ICD-10-CM

## 2017-03-10 DIAGNOSIS — E11.42 DIABETIC POLYNEUROPATHY ASSOCIATED WITH TYPE 2 DIABETES MELLITUS: Primary | ICD-10-CM

## 2017-03-10 DIAGNOSIS — L97.519 DIABETIC ULCER OF RIGHT FOOT ASSOCIATED WITH TYPE 2 DIABETES MELLITUS: ICD-10-CM

## 2017-03-10 PROCEDURE — 99999 PR PBB SHADOW E&M-EST. PATIENT-LVL III: CPT | Mod: PBBFAC,,, | Performed by: PODIATRIST

## 2017-03-10 PROCEDURE — 99499 UNLISTED E&M SERVICE: CPT | Mod: S$GLB,,, | Performed by: PODIATRIST

## 2017-03-10 PROCEDURE — 11042 DBRDMT SUBQ TIS 1ST 20SQCM/<: CPT | Mod: S$GLB,,, | Performed by: PODIATRIST

## 2017-03-10 NOTE — PROGRESS NOTES
Subjective:      Patient ID: Jesús Olvera is a 71 y.o. male.    Chief Complaint: Foot Ulcer (toe ulcer right ft. 2nd toe); Follow-up; and Diabetes Mellitus    Jesús is a 71 y.o. male who presents to the clinic for evaluation and treatment of high risk feet. Jesús has a past medical history of Anemia associated with chronic renal failure; Arthritis; CAD (coronary atherosclerotic disease); CHF (congestive heart failure); -donor kidney transplant (2004); Diabetes mellitus; Encounter for blood transfusion; GIB (gastrointestinal bleeding) (10/23/2014); Neurogenic bladder; Osteomyelitis of lumbar vertebra (10/20/2014); PVD (peripheral vascular disease); Renal hypertension; Secondary hyperparathyroidism of renal origin; Secondary myopathy; and Thyroid nodule (2016). The patient's chief complaint is diabetic foot ulcer, R foot. This patient has documented high risk feet requiring routine maintenance secondary to diabetes mellitis and those secondary complications of diabetes, as mentioned.  Denies injury. He has been applying neosporin daily. Denies drainage or swelling at toe. He is just starting to walk a little with PT.     PCP: Dante Joiner MD    Date Last Seen by PCP:   Chief Complaint   Patient presents with    Foot Ulcer     toe ulcer right ft. 2nd toe    Follow-up    Diabetes Mellitus         Current shoe gear:  Affected Foot: Rx diabetic extra depth shoes and custom accommodative insoles     Unaffected Foot: Rx diabetic extra depth shoes and custom accommodative insoles    History of Trauma: negative  Sign of Infection: none    Hemoglobin A1C   Date Value Ref Range Status   06/10/2016 7.7 (H) 4.5 - 6.2 % Final   10/17/2014 7.9 (H) 4.5 - 6.2 % Final   2012 8.7 (H) 4.0 - 6.2 % Final         Review of Systems   Constitution: Negative for chills, fever and night sweats.   Cardiovascular: Positive for leg swelling. Negative for chest pain.   Respiratory: Negative for shortness  of breath.    Skin: Positive for poor wound healing.   Musculoskeletal: Negative for joint pain and joint swelling.        L foot partial amputation.    Gastrointestinal: Negative for diarrhea, nausea and vomiting.   Neurological: Positive for numbness and paresthesias.         Objective:      Physical Exam   Constitutional: He is oriented to person, place, and time. He appears well-developed and well-nourished. No distress.   Cardiovascular: Normal rate and intact distal pulses.    Musculoskeletal: Normal range of motion. He exhibits edema (Mild). He exhibits no tenderness.   L transmetatarsal amputation    Neurological: He is alert and oriented to person, place, and time. He exhibits normal muscle tone.   Neurologic: Sharp/dull sensation absent L foot.   Skin: Skin is warm and dry. No rash noted. He is not diaphoretic. No erythema. No pallor.   Ulcer Location: distal 2nd toe  Measurements: 0.4 x 0.4 x 0.1 cm  Periwound: Intact  Drainage: none  Pus: None.  Malodor: None.  Base:  Fibro ganular  Signs of infection: None.     Toenails 1-5, right are elongated, thickened by 2-3 mm, discolored/yellowed, dystrophic, brittle with subungual debris.     Psychiatric: He has a normal mood and affect. His behavior is normal. Judgment and thought content normal.   Nursing note and vitals reviewed.          Assessment:       Encounter Diagnoses   Name Primary?    Diabetic polyneuropathy associated with type 2 diabetes mellitus Yes    PVD (peripheral vascular disease)     Diabetic ulcer of right foot associated with type 2 diabetes mellitus     S/P transmetatarsal amputation of foot, left    This patient has documented high risk feet requiring routine maintenance secondary to diabetes mellitis and those secondary complications of diabetes, as mentioned.        Plan:       Jesús was seen today for foot ulcer, follow-up and diabetes mellitus.    Diagnoses and all orders for this visit:    Diabetic polyneuropathy associated  with type 2 diabetes mellitus    PVD (peripheral vascular disease)    Diabetic ulcer of right foot associated with type 2 diabetes mellitus    S/P transmetatarsal amputation of foot, left    - Patient was given written and verbal instructions regarding foot condition.  I counseled the patient on his conditions, their implications and medical management.    - The wound is cleansed as much as possible and dressed with Kalee and Mepelex border. The patient is alerted to watch for any signs of infection (redness, pus, pain, increased swelling or fever) and call if such occurs. Home wound care instructions are provided.     - Offload with surgical shoe and crest pad.     - With patient's permission, Shawna Shah MA assisted me with the application of a football dressing R foot under my direct supervision. Darco shoe applied and patient instructed to never walk without the Darco shoe on the foot.     - Obtained verbal consent from the patient for excisional wound debridement, right 2nd toe. No anesthesia was required, Utilizing a sterile curet, all non-viable soft tissue was excised to level of health subcutaneous tissue. A healthy appearing wound base was achieved with minimal blood loss. Hemostasis achieved with compression. Wound site was irrigated with normal saline and dressed. Wound care instructions were reviewed with the patient. Patient tolerated the procedure well.    Shoe inspection. Diabetic Foot Education. Patient reminded of the importance of good nutrition and blood sugar control to help prevent podiatric complications of diabetes. Patient instructed on proper foot hygeine. We discussed wearing proper shoe gear, daily foot inspections, never walking without protective shoe gear, never putting sharp instruments to feet    No Follow-up on file.

## 2017-03-13 DIAGNOSIS — N18.30 CHRONIC KIDNEY DISEASE, STAGE III (MODERATE): ICD-10-CM

## 2017-03-14 ENCOUNTER — TELEPHONE (OUTPATIENT)
Dept: NEPHROLOGY | Facility: CLINIC | Age: 72
End: 2017-03-14

## 2017-03-14 RX ORDER — HYDRALAZINE HYDROCHLORIDE 10 MG/1
10 TABLET, FILM COATED ORAL EVERY 12 HOURS
Qty: 60 TABLET | Refills: 11 | Status: SHIPPED | OUTPATIENT
Start: 2017-03-14 | End: 2017-06-26 | Stop reason: SDUPTHER

## 2017-03-14 NOTE — TELEPHONE ENCOUNTER
----- Message from Tracey aHrry sent at 3/13/2017  4:03 PM CDT -----  Contact: Patient: [phone]  OUT OF MEDS.....Patient called and is needing a refill on his tacrolimus (PROGRAF) 1 MG Cap.     Pharmacy used is [facility]/pharmacy #[id number] - [place], [place] - [address] [phone] (Phone) [phone] (Fax)    Patient can be reached at [phone].    Thanks       Called pt several times  the phone but can't hear me ,PA was sent to insurance company and pharmacy last week

## 2017-03-22 ENCOUNTER — CLINICAL SUPPORT (OUTPATIENT)
Dept: REHABILITATION | Facility: HOSPITAL | Age: 72
End: 2017-03-22
Attending: NEUROLOGICAL SURGERY
Payer: MEDICARE

## 2017-03-22 DIAGNOSIS — R26.9 GAIT DIFFICULTY: ICD-10-CM

## 2017-03-22 PROCEDURE — 97110 THERAPEUTIC EXERCISES: CPT | Mod: PO

## 2017-03-22 NOTE — PROGRESS NOTES
"Name: Jesús Olvera  Clinic Number: [id number]  2017.     Diagnosis: s/p lumbar fusion   Physician: William Maxwell MD  Treatment Orders: PT Eval and Treat    Past Medical History:   Diagnosis Date    Anemia associated with chronic renal failure     Arthritis     CAD (coronary atherosclerotic disease)     CHF (congestive heart failure)     -donor kidney transplant 2004    Diabetes mellitus     Encounter for blood transfusion     GIB (gastrointestinal bleeding) 10/23/2014    Neurogenic bladder     Osteomyelitis of lumbar vertebra 10/20/2014    PVD (peripheral vascular disease)     Heel ulcers with osteomyelitis, subsequent hemipedectomy on the left      Renal hypertension     Secondary hyperparathyroidism of renal origin     Secondary myopathy     steriod induced    Thyroid nodule 2016     Current Outpatient Prescriptions   Medication Sig    amlodipine (NORVASC) 10 MG tablet Take 10 mg by mouth once daily.      aspirin 81 MG Chew Take 81 mg by mouth every evening.     BD INSULIN SYRINGE ULTRA-FINE 1/2 mL 31 x 5/16" Syrg     carvedilol (COREG) 25 MG tablet Take 2 tablets (50 mg total) by mouth 2 (two) times daily with meals.    cholecalciferol, vitamin D3, 1,000 unit capsule Take 2 capsules (2,000 Units total) by mouth once daily.    dorzolamide-timolol 2-0.5% (COSOPT) 22.3-6.8 mg/mL ophthalmic solution     ergocalciferol (ERGOCALCIFEROL) 50,000 unit Cap Take 1 capsule (50,000 Units total) by mouth every 7 days.    ferrous sulfate 325 (65 FE) MG EC tablet Take 1 tablet (325 mg total) by mouth once daily.    gabapentin (NEURONTIN) 300 MG capsule Take 300 mg by mouth 3 (three) times daily.    hydrALAZINE (APRESOLINE) 10 MG tablet Take 1 tablet (10 mg total) by mouth every 12 (twelve) hours.    insulin aspart (NOVOLOG) 100 unit/mL injection Inject 10 Units into the skin 3 (three) times daily with meals. (Patient taking differently: Inject 10 Units into the skin 3 (three) " times daily with meals. Pt stated that he is taking 13 units 3 times daily w/ meals. CF)    LANTUS 100 unit/mL injection Inject 20 Units into the skin 2 (two) times daily.    LUMIGAN 0.01 % Drop Place 1 drop into both eyes every evening.     multivitamin capsule Take 1 capsule by mouth once daily.    mycophenolate (CELLCEPT) 250 mg Cap Take 2 capsules (500 mg total) by mouth 2 (two) times daily.    omeprazole (PRILOSEC) 20 MG capsule Take 2 capsules (40 mg total) by mouth once daily.    oxycodone-acetaminophen (LYNOX) 7.5-300 mg per tablet Take 1 tablet by mouth every 6 (six) hours as needed for Pain.    oxycodone-acetaminophen (PERCOCET) 7.5-325 mg per tablet Take 1 tablet by mouth every 4 (four) hours as needed for Pain.    predniSONE (DELTASONE) 5 MG tablet Take 5 mg by mouth once daily.      senna-docusate 8.6-50 mg (PERICOLACE) 8.6-50 mg per tablet Take 1 tablet by mouth 2 (two) times daily as needed for Constipation.    simvastatin (ZOCOR) 40 MG tablet 40 mg every evening.     sodium bicarbonate 650 MG tablet TAKE 1 TABLET (650 MG TOTAL) BY MOUTH ONCE DAILY.    tacrolimus (PROGRAF) 1 MG Cap Take 2 capsules (2 mg total) by mouth every 12 (twelve) hours.    tamsulosin (FLOMAX) 0.4 mg Cp24 take 1 capsule by mouth once daily    valsartan (DIOVAN) 160 MG tablet Take 160 mg by mouth once daily.    vitamin renal formula, B-complex-vitamin c-folic acid, (TRIPHROCAPS) 1 mg Cap Take 1 capsule by mouth once daily.     No current facility-administered medications for this visit.      Review of patient's allergies indicates:  No Known Allergies  Precautions: fall; back brace 2/2 hardware failure of previous lumbar fusion       Subjective:      Jesús had family visiting. Walked a few times on his 30' deck with rollator.     Pain is rated on a 0-10 scale with 0 being no pain and 10 being pain requiring emergency room visit.    Diagnostic Tests: Radiographs    Patient Goals for PT: standing, walking with  "AD      Objective:    Visit # 19  Time In: 1300  Time Out: 1345  Treatment time: 30'  Date of eval/re-eval: 3/6/2017    Knee ext strength R/L 4- / 4-   STS test 30" 6x with use of hands and WW      2 MWT 46' with WW, supervision, WC following; 1 seated rest breaks  Previous 2 MWT 26' with WW, supervision <> contact guard assist WC following; 3 seated rest breaks     [2] Therapeutic exercise x 30'  LAQ 3 x 10 5#  Seated D2 flexion with stick 4# 3 x 10   Gait w/ WW, supervision, WC following    30' 3' rest    30' 2' rest   30' 1' rest    30' 2' rest     30' 2' rest      Written HEP Provided: yes    Patient education: gait training at home with rollator walker.   Jesús demo good understanding of the education provided. Patient demo good return demo of skill of exercises.1315    Problem List: deconditioned, co-morbidities; LE weakness;       Assessment:    Jesús tolerated tx without increase in sxs. Descending rest breaks to determine adequate recovery time, as 2 minutes seems to be his sweet spot.   Jeúss demonstrates no additional cultural, spiritual or educational needs and currently has no barriers to learning.  Gait goals updated.     Medical necessity: see Problem List      Functional Goals  Time frame     1.   The pt will stand unsupported with normal posture  for > 5' without increase in sxs.  ongoing  6 weeks     2.   The pt will tolerate standing frame > 20" without increase in sxs. Ongoing 10'   6 weeks     3.   The pt will ambulate > 20' with WW for improved household ambulation. MET  6-8 weeks     4.   The pt will be independent in performance and progression of HEP. ongoing    2-3 visits     5.    The pt will ambulate safely / [I] up/down the flight up stairs in his home using BUE support for improved household ambulation.   6 weeks     6.    The pt will ambulate > 50' in 2 minutes with WW for improved household ambulation. 46' MET  6 weeks     7.   The pt will ambulate > 100' in 2 minutes with WW for " improved household ambulation.   6 - 8 weeks     8.  The pt will ambulate > 300' in 6 minutes with WW for improved community ambulation.   8 weeks            Plan:      Proceed/Continue with POC.     Pt will be treated by physical therapy 1-2x/week during the certification period. Treatment will consist of therapeutic exercise, manual therapy, neuromuscular re-education, heat and cold modalities, electrical stimulation for pain relief and/or muscle activation, and any other types of treatment hat may be deemed medically necessary. [facility] may at times be seen by a PTA as part of the Rehab Team.       Physical Therapist: KRISTAL Bautista, PT, DPT, CSCS    I certify the need for these services furnished under this plan of treatment and while under my care.       ___________________________________  Physician/Referring Practitioner        _________________  Date of Signature

## 2017-03-24 ENCOUNTER — CLINICAL SUPPORT (OUTPATIENT)
Dept: REHABILITATION | Facility: HOSPITAL | Age: 72
End: 2017-03-24
Attending: NEUROLOGICAL SURGERY
Payer: MEDICARE

## 2017-03-24 DIAGNOSIS — R26.9 GAIT DIFFICULTY: ICD-10-CM

## 2017-03-24 PROCEDURE — 97110 THERAPEUTIC EXERCISES: CPT | Mod: PO

## 2017-03-24 NOTE — PROGRESS NOTES
"Name: Jesús Olvera  Clinic Number: [id number]  2017.     Diagnosis: s/p lumbar fusion   Physician: William Maxwell MD  Treatment Orders: PT Eval and Treat    Past Medical History:   Diagnosis Date    Anemia associated with chronic renal failure     Arthritis     CAD (coronary atherosclerotic disease)     CHF (congestive heart failure)     -donor kidney transplant 2004    Diabetes mellitus     Encounter for blood transfusion     GIB (gastrointestinal bleeding) 10/23/2014    Neurogenic bladder     Osteomyelitis of lumbar vertebra 10/20/2014    PVD (peripheral vascular disease)     Heel ulcers with osteomyelitis, subsequent hemipedectomy on the left      Renal hypertension     Secondary hyperparathyroidism of renal origin     Secondary myopathy     steriod induced    Thyroid nodule 2016     Current Outpatient Prescriptions   Medication Sig    amlodipine (NORVASC) 10 MG tablet Take 10 mg by mouth once daily.      aspirin 81 MG Chew Take 81 mg by mouth every evening.     BD INSULIN SYRINGE ULTRA-FINE 1/2 mL 31 x 5/16" Syrg     carvedilol (COREG) 25 MG tablet Take 2 tablets (50 mg total) by mouth 2 (two) times daily with meals.    cholecalciferol, vitamin D3, 1,000 unit capsule Take 2 capsules (2,000 Units total) by mouth once daily.    dorzolamide-timolol 2-0.5% (COSOPT) 22.3-6.8 mg/mL ophthalmic solution     ergocalciferol (ERGOCALCIFEROL) 50,000 unit Cap Take 1 capsule (50,000 Units total) by mouth every 7 days.    ferrous sulfate 325 (65 FE) MG EC tablet Take 1 tablet (325 mg total) by mouth once daily.    gabapentin (NEURONTIN) 300 MG capsule Take 300 mg by mouth 3 (three) times daily.    hydrALAZINE (APRESOLINE) 10 MG tablet Take 1 tablet (10 mg total) by mouth every 12 (twelve) hours.    insulin aspart (NOVOLOG) 100 unit/mL injection Inject 10 Units into the skin 3 (three) times daily with meals. (Patient taking differently: Inject 10 Units into the skin 3 (three) " times daily with meals. Pt stated that he is taking 13 units 3 times daily w/ meals. CF)    LANTUS 100 unit/mL injection Inject 20 Units into the skin 2 (two) times daily.    LUMIGAN 0.01 % Drop Place 1 drop into both eyes every evening.     multivitamin capsule Take 1 capsule by mouth once daily.    mycophenolate (CELLCEPT) 250 mg Cap Take 2 capsules (500 mg total) by mouth 2 (two) times daily.    omeprazole (PRILOSEC) 20 MG capsule Take 2 capsules (40 mg total) by mouth once daily.    oxycodone-acetaminophen (LYNOX) 7.5-300 mg per tablet Take 1 tablet by mouth every 6 (six) hours as needed for Pain.    oxycodone-acetaminophen (PERCOCET) 7.5-325 mg per tablet Take 1 tablet by mouth every 4 (four) hours as needed for Pain.    predniSONE (DELTASONE) 5 MG tablet Take 5 mg by mouth once daily.      senna-docusate 8.6-50 mg (PERICOLACE) 8.6-50 mg per tablet Take 1 tablet by mouth 2 (two) times daily as needed for Constipation.    simvastatin (ZOCOR) 40 MG tablet 40 mg every evening.     sodium bicarbonate 650 MG tablet TAKE 1 TABLET (650 MG TOTAL) BY MOUTH ONCE DAILY.    tacrolimus (PROGRAF) 1 MG Cap Take 2 capsules (2 mg total) by mouth every 12 (twelve) hours.    tamsulosin (FLOMAX) 0.4 mg Cp24 take 1 capsule by mouth once daily    valsartan (DIOVAN) 160 MG tablet Take 160 mg by mouth once daily.    vitamin renal formula, B-complex-vitamin c-folic acid, (TRIPHROCAPS) 1 mg Cap Take 1 capsule by mouth once daily.     No current facility-administered medications for this visit.      Review of patient's allergies indicates:  No Known Allergies  Precautions: fall; back brace 2/2 hardware failure of previous lumbar fusion       Subjective:      Jesús had multiple guests/phone calls yesterday that preventing him from walking.     Pain is rated on a 0-10 scale with 0 being no pain and 10 being pain requiring emergency room visit.    Diagnostic Tests: Radiographs    Patient Goals for PT: standing, walking with  "AD      Objective:    YTB units this visit included: 20 units TX  Visit # 20  Time In: 1300  Time Out: 1345  Treatment time: 30'  Date of eval/re-eval: 3/6/2017    Knee ext strength R/L 4- / 4-   STS test 30" 6x with use of hands and WW      2 MWT 46' with WW, supervision, WC following; 1 seated rest breaks  Previous 2 MWT 26' with WW, supervision <> contact guard assist WC following; 3 seated rest breaks     [2] Therapeutic exercise x 30'  LAQ 3 x 15 5#  Seated db press 3 x 8 9#  Seated cook band row 3 x 12   Gait w/ WW, supervision, WC following    30' 90" rest    30' 90" rest   30' 90" rest   30' ~ 3' rest" rest     45', end treatment     Written HEP Provided: yes    Patient education: gait training at home with rollator walker.   Jesús demo good understanding of the education provided. Patient demo good return demo of skill of exercises.1315    Problem List: deconditioned, co-morbidities; LE weakness;       Assessment:    Jesús tolerated tx without increase in sxs. Descending rest breaks to determine adequate recovery time, as 2 minutes seems to be his sweet spot.   Jesús demonstrates no additional cultural, spiritual or educational needs and currently has no barriers to learning.  Gait goals updated.     Medical necessity: see Problem List      Functional Goals  Time frame     1.   The pt will stand unsupported with normal posture  for > 5' without increase in sxs.  ongoing  6 weeks     2.   The pt will tolerate standing frame > 20" without increase in sxs. Ongoing 10'   6 weeks     3.   The pt will ambulate > 20' with WW for improved household ambulation. MET  6-8 weeks     4.   The pt will be independent in performance and progression of HEP. ongoing    2-3 visits     5.    The pt will ambulate safely / [I] up/down the flight up stairs in his home using BUE support for improved household ambulation.   6 weeks     6.    The pt will ambulate > 50' in 2 minutes with WW for improved household ambulation. 46' " MET  6 weeks     7.   The pt will ambulate > 100' in 2 minutes with WW for improved household ambulation.   6 - 8 weeks     8.  The pt will ambulate > 300' in 6 minutes with WW for improved community ambulation.   8 weeks            Plan:      Proceed/Continue with POC.     Pt will be treated by physical therapy 1-2x/week during the certification period. Treatment will consist of therapeutic exercise, manual therapy, neuromuscular re-education, heat and cold modalities, electrical stimulation for pain relief and/or muscle activation, and any other types of treatment hat may be deemed medically necessary. Jesús may at times be seen by a PTA as part of the Rehab Team.       Physical Therapist: KRISTAL Bautista, PT, DPT, CSCS    I certify the need for these services furnished under this plan of treatment and while under my care.       ___________________________________  Physician/Referring Practitioner        _________________  Date of Signature

## 2017-03-29 ENCOUNTER — CLINICAL SUPPORT (OUTPATIENT)
Dept: REHABILITATION | Facility: HOSPITAL | Age: 72
End: 2017-03-29
Attending: NEUROLOGICAL SURGERY
Payer: MEDICARE

## 2017-03-29 DIAGNOSIS — R26.9 GAIT DIFFICULTY: ICD-10-CM

## 2017-03-29 PROCEDURE — 97110 THERAPEUTIC EXERCISES: CPT | Mod: PO

## 2017-03-29 NOTE — PROGRESS NOTES
"Name: Jesús Olvera  [facility] Number: [id number]  2017.     Diagnosis: s/p lumbar fusion   Physician: William Maxwell MD  Treatment Orders: PT Eval and Treat    Past Medical History:   Diagnosis Date    Anemia associated with chronic renal failure     Arthritis     CAD (coronary atherosclerotic disease)     CHF (congestive heart failure)     -donor kidney transplant 2004    Diabetes mellitus     Encounter for blood transfusion     GIB (gastrointestinal bleeding) 10/23/2014    Neurogenic bladder     Osteomyelitis of lumbar vertebra 10/20/2014    PVD (peripheral vascular disease)     Heel ulcers with osteomyelitis, subsequent hemipedectomy on the left      Renal hypertension     Secondary hyperparathyroidism of renal origin     Secondary myopathy     steriod induced    Thyroid nodule 2016     Current Outpatient Prescriptions   Medication Sig    amlodipine (NORVASC) 10 MG tablet Take 10 mg by mouth once daily.      aspirin 81 MG Chew Take 81 mg by mouth every evening.     BD INSULIN SYRINGE ULTRA-FINE 1/2 mL 31 x 5/16" Syrg     carvedilol (COREG) 25 MG tablet Take 2 tablets (50 mg total) by mouth 2 (two) times daily with meals.    cholecalciferol, vitamin D3, 1,000 unit capsule Take 2 capsules (2,000 Units total) by mouth once daily.    dorzolamide-timolol 2-0.5% (COSOPT) 22.3-6.8 mg/mL ophthalmic solution     ferrous sulfate 325 (65 FE) MG EC tablet Take 1 tablet (325 mg total) by mouth once daily.    gabapentin (NEURONTIN) 300 MG capsule Take 300 mg by mouth 3 (three) times daily.    hydrALAZINE (APRESOLINE) 10 MG tablet Take 1 tablet (10 mg total) by mouth every 12 (twelve) hours.    insulin aspart (NOVOLOG) 100 unit/mL injection Inject 10 Units into the skin 3 (three) times daily with meals. (Patient taking differently: Inject 10 Units into the skin 3 (three) times daily with meals. Pt stated that he is taking 13 units 3 times daily w/ meals. CF)    LANTUS 100 unit/mL " injection Inject 20 Units into the skin 2 (two) times daily.    LUMIGAN 0.01 % Drop Place 1 drop into both eyes every evening.     multivitamin capsule Take 1 capsule by mouth once daily.    mycophenolate (CELLCEPT) 250 mg Cap Take 2 capsules (500 mg total) by mouth 2 (two) times daily.    omeprazole (PRILOSEC) 20 MG capsule Take 2 capsules (40 mg total) by mouth once daily.    oxycodone-acetaminophen (LYNOX) 7.5-300 mg per tablet Take 1 tablet by mouth every 6 (six) hours as needed for Pain.    oxycodone-acetaminophen (PERCOCET) 7.5-325 mg per tablet Take 1 tablet by mouth every 4 (four) hours as needed for Pain.    predniSONE (DELTASONE) 5 MG tablet Take 5 mg by mouth once daily.      senna-docusate 8.6-50 mg (PERICOLACE) 8.6-50 mg per tablet Take 1 tablet by mouth 2 (two) times daily as needed for Constipation.    simvastatin (ZOCOR) 40 MG tablet 40 mg every evening.     sodium bicarbonate 650 MG tablet TAKE 1 TABLET (650 MG TOTAL) BY MOUTH ONCE DAILY.    tacrolimus (PROGRAF) 1 MG Cap Take 2 capsules (2 mg total) by mouth every 12 (twelve) hours.    tamsulosin (FLOMAX) 0.4 mg Cp24 take 1 capsule by mouth once daily    valsartan (DIOVAN) 160 MG tablet Take 160 mg by mouth once daily.    vitamin renal formula, B-complex-vitamin c-folic acid, (TRIPHROCAPS) 1 mg Cap Take 1 capsule by mouth once daily.     No current facility-administered medications for this visit.      Review of patient's allergies indicates:  No Known Allergies  Precautions: fall; back brace 2/2 hardware failure of previous lumbar fusion       Subjective:      Jesús reports been ambulating on his patio.     Pain is rated on a 0-10 scale with 0 being no pain and 10 being pain requiring emergency room visit.    Diagnostic Tests: Radiographs    Patient Goals for PT: standing, walking with AD      Objective:    YTB units this visit included: 22 units TX  Visit # 21  Time In: 1300  Time Out: 1330  Treatment time: 30'  Date of  "eval/re-eval: 3/6/2017    Knee ext strength R/L 4- / 4-   STS test 30" 6x with use of hands and WW      2 MWT 46' with WW, supervision, WC following; 1 seated rest breaks  Previous 2 MWT 26' with WW, supervision <> contact guard assist WC following; 3 seated rest breaks     [2] Therapeutic exercise x 30'  LAQ 3 x 15 5#  Seated db press 3 x 10 10#  Gait 4 x 50' with rollator, WC following; 3' seated rest breaks     Written HEP Provided: yes    Patient education: gait training at home with rollator walker.   Jesús demjeannette good understanding of the education provided. Patient demo good return demo of skill of exercises.1315    Problem List: deconditioned, co-morbidities; LE weakness;       Assessment:    Jesús tolerated tx without increase in sxs. Larger gait distances today.   Jesús demonstrates no additional cultural, spiritual or educational needs and currently has no barriers to learning.  Gait goals updated.     Medical necessity: see Problem List      Functional Goals  Time frame     1.   The pt will stand unsupported with normal posture  for > 5' without increase in sxs.  ongoing  6 weeks     2.   The pt will tolerate standing frame > 20" without increase in sxs. Ongoing 10'   6 weeks     3.   The pt will ambulate > 20' with WW for improved household ambulation. MET  6-8 weeks     4.   The pt will be independent in performance and progression of HEP. ongoing    2-3 visits     5.    The pt will ambulate safely / [I] up/down the flight up stairs in his home using BUE support for improved household ambulation.   6 weeks     6.    The pt will ambulate > 50' in 2 minutes with WW for improved household ambulation. 46' MET  6 weeks     7.   The pt will ambulate > 100' in 2 minutes with WW for improved household ambulation.   6 - 8 weeks     8.  The pt will ambulate > 300' in 6 minutes with WW for improved community ambulation.   8 weeks            Plan:      Proceed/Continue with POC.     Pt will be treated by physical " therapy 1-2x/week during the certification period. Treatment will consist of therapeutic exercise, manual therapy, neuromuscular re-education, heat and cold modalities, electrical stimulation for pain relief and/or muscle activation, and any other types of treatment hat may be deemed medically necessary. Jesús may at times be seen by a PTA as part of the Rehab Team.       Physical Therapist: KRISTAL Bautista, PT, DPT, CSCS    I certify the need for these services furnished under this plan of treatment and while under my care.       ___________________________________  Physician/Referring Practitioner        _________________  Date of Signature

## 2017-03-31 ENCOUNTER — OFFICE VISIT (OUTPATIENT)
Dept: PODIATRY | Facility: CLINIC | Age: 72
End: 2017-03-31
Payer: MEDICARE

## 2017-03-31 VITALS — SYSTOLIC BLOOD PRESSURE: 152 MMHG | DIASTOLIC BLOOD PRESSURE: 72 MMHG | HEART RATE: 64 BPM | HEIGHT: 74 IN

## 2017-03-31 DIAGNOSIS — Z89.432 S/P TRANSMETATARSAL AMPUTATION OF FOOT, LEFT: ICD-10-CM

## 2017-03-31 DIAGNOSIS — I73.9 PVD (PERIPHERAL VASCULAR DISEASE): ICD-10-CM

## 2017-03-31 DIAGNOSIS — L97.519 DIABETIC ULCER OF RIGHT FOOT ASSOCIATED WITH TYPE 2 DIABETES MELLITUS: Primary | ICD-10-CM

## 2017-03-31 DIAGNOSIS — E11.621 DIABETIC ULCER OF TOE OF RIGHT FOOT ASSOCIATED WITH TYPE 2 DIABETES MELLITUS, WITH FAT LAYER EXPOSED: ICD-10-CM

## 2017-03-31 DIAGNOSIS — L97.512 DIABETIC ULCER OF TOE OF RIGHT FOOT ASSOCIATED WITH TYPE 2 DIABETES MELLITUS, WITH FAT LAYER EXPOSED: ICD-10-CM

## 2017-03-31 DIAGNOSIS — E11.42 DIABETIC POLYNEUROPATHY ASSOCIATED WITH TYPE 2 DIABETES MELLITUS: ICD-10-CM

## 2017-03-31 DIAGNOSIS — E11.621 DIABETIC ULCER OF RIGHT FOOT ASSOCIATED WITH TYPE 2 DIABETES MELLITUS: Primary | ICD-10-CM

## 2017-03-31 PROCEDURE — 99499 UNLISTED E&M SERVICE: CPT | Mod: S$GLB,,, | Performed by: PODIATRIST

## 2017-03-31 PROCEDURE — 99999 PR PBB SHADOW E&M-EST. PATIENT-LVL II: CPT | Mod: PBBFAC,,, | Performed by: PODIATRIST

## 2017-03-31 PROCEDURE — 11042 DBRDMT SUBQ TIS 1ST 20SQCM/<: CPT | Mod: S$GLB,,, | Performed by: PODIATRIST

## 2017-03-31 NOTE — PROGRESS NOTES
Subjective:      Patient ID: Jesús Olvera is a 71 y.o. male.    Chief Complaint: Foot Ulcer (right ft. 2nd toe ulcer/ PCP Dr. Joiner 16); Follow-up; and Diabetes Mellitus    Jesús is a 71 y.o. male who presents to the clinic for evaluation and treatment of high risk feet. eJsús has a past medical history of Anemia associated with chronic renal failure; Arthritis; CAD (coronary atherosclerotic disease); CHF (congestive heart failure); -donor kidney transplant (2004); Diabetes mellitus; Encounter for blood transfusion; GIB (gastrointestinal bleeding) (10/23/2014); Neurogenic bladder; Osteomyelitis of lumbar vertebra (10/20/2014); PVD (peripheral vascular disease); Renal hypertension; Secondary hyperparathyroidism of renal origin; Secondary myopathy; and Thyroid nodule (2016). The patient's chief complaint is diabetic foot ulcer, R foot. This patient has documented high risk feet requiring routine maintenance secondary to diabetes mellitis and those secondary complications of diabetes, as mentioned.  Denies injury. He has been applying neosporin daily. Denies drainage or swelling at toe. He is just starting to walk a little with PT.     PCP: Dante Joiner MD    Date Last Seen by PCP:   Chief Complaint   Patient presents with    Foot Ulcer     right ft. 2nd toe ulcer/ PCP Dr. Joiner 16    Follow-up    Diabetes Mellitus         Current shoe gear:  Affected Foot: Rx diabetic extra depth shoes and custom accommodative insoles     Unaffected Foot: Rx diabetic extra depth shoes and custom accommodative insoles    History of Trauma: negative  Sign of Infection: none    Hemoglobin A1C   Date Value Ref Range Status   06/10/2016 7.7 (H) 4.5 - 6.2 % Final   10/17/2014 7.9 (H) 4.5 - 6.2 % Final   2012 8.7 (H) 4.0 - 6.2 % Final         Review of Systems   Constitution: Negative for chills, fever and night sweats.   Cardiovascular: Positive for leg swelling. Negative for chest  pain.   Respiratory: Negative for shortness of breath.    Skin: Positive for poor wound healing.   Musculoskeletal: Negative for joint pain and joint swelling.        L foot partial amputation.    Gastrointestinal: Negative for diarrhea, nausea and vomiting.   Neurological: Positive for numbness and paresthesias.         Objective:      Physical Exam   Constitutional: He is oriented to person, place, and time. He appears well-developed and well-nourished. No distress.   Cardiovascular: Normal rate and intact distal pulses.    Musculoskeletal: Normal range of motion. He exhibits edema (Mild). He exhibits no tenderness.   L transmetatarsal amputation    Neurological: He is alert and oriented to person, place, and time. He exhibits normal muscle tone.   Neurologic: Sharp/dull sensation absent L foot.   Skin: Skin is warm and dry. No rash noted. He is not diaphoretic. No erythema. No pallor.   Ulcer Location: distal 2nd toe  Measurements: 0.3 x 0.2 x 0.1 cm  Periwound: Intact  Drainage: none  Pus: None.  Malodor: None.  Base:  Fibro ganular  Signs of infection: None.     Toenails 1-5, right are elongated, thickened by 2-3 mm, discolored/yellowed, dystrophic, brittle with subungual debris.     Psychiatric: He has a normal mood and affect. His behavior is normal. Judgment and thought content normal.   Nursing note and vitals reviewed.          Assessment:       Encounter Diagnoses   Name Primary?    Diabetic polyneuropathy associated with type 2 diabetes mellitus Yes    PVD (peripheral vascular disease)     Diabetic ulcer of right foot associated with type 2 diabetes mellitus     S/P transmetatarsal amputation of foot, left    This patient has documented high risk feet requiring routine maintenance secondary to diabetes mellitis and those secondary complications of diabetes, as mentioned.        Plan:       Jesús was seen today for foot ulcer, follow-up and diabetes mellitus.    Diagnoses and all orders for this  visit:    Diabetic polyneuropathy associated with type 2 diabetes mellitus    PVD (peripheral vascular disease)    Diabetic ulcer of right foot associated with type 2 diabetes mellitus    S/P transmetatarsal amputation of foot, left    - Patient was given written and verbal instructions regarding foot condition.  I counseled the patient on his conditions, their implications and medical management.    - The wound is cleansed as much as possible and dressed with Kalee and Mepelex border. The patient is alerted to watch for any signs of infection (redness, pus, pain, increased swelling or fever) and call if such occurs. Home wound care instructions are provided.     - Offload with surgical shoe and crest pad.   - Iodosorb daily.    - With patient's permission, Shawna Shah MA assisted me with the application of a football dressing R foot under my direct supervision. Darco shoe applied and patient instructed to never walk without the Darco shoe on the foot.     - Obtained verbal consent from the patient for excisional wound debridement, right 2nd toe. No anesthesia was required, Utilizing a sterile curet, all non-viable soft tissue was excised to level of health subcutaneous tissue. A healthy appearing wound base was achieved with minimal blood loss. Hemostasis achieved with compression. Wound site was irrigated with normal saline and dressed. Wound care instructions were reviewed with the patient. Patient tolerated the procedure well.    Shoe inspection. Diabetic Foot Education. Patient reminded of the importance of good nutrition and blood sugar control to help prevent podiatric complications of diabetes. Patient instructed on proper foot hygeine. We discussed wearing proper shoe gear, daily foot inspections, never walking without protective shoe gear, never putting sharp instruments to feet      - f/u 2 weeks or sooner prn.  No Follow-up on file.

## 2017-04-05 DIAGNOSIS — N18.30 CHRONIC KIDNEY DISEASE, STAGE III (MODERATE): ICD-10-CM

## 2017-04-05 RX ORDER — MYCOPHENOLATE MOFETIL 250 MG/1
500 CAPSULE ORAL 2 TIMES DAILY
Qty: 120 CAPSULE | Refills: 11 | Status: SHIPPED | OUTPATIENT
Start: 2017-04-05 | End: 2017-05-15 | Stop reason: SDUPTHER

## 2017-04-05 RX ORDER — MYCOPHENOLATE MOFETIL 250 MG/1
CAPSULE ORAL
Qty: 120 CAPSULE | Refills: 0 | OUTPATIENT
Start: 2017-04-05

## 2017-04-21 ENCOUNTER — OFFICE VISIT (OUTPATIENT)
Dept: PODIATRY | Facility: CLINIC | Age: 72
End: 2017-04-21
Payer: MEDICARE

## 2017-04-21 VITALS — DIASTOLIC BLOOD PRESSURE: 70 MMHG | SYSTOLIC BLOOD PRESSURE: 159 MMHG | HEIGHT: 74 IN | HEART RATE: 62 BPM

## 2017-04-21 DIAGNOSIS — I73.9 PVD (PERIPHERAL VASCULAR DISEASE): ICD-10-CM

## 2017-04-21 DIAGNOSIS — E11.621 DIABETIC ULCER OF TOE OF RIGHT FOOT ASSOCIATED WITH TYPE 2 DIABETES MELLITUS, WITH FAT LAYER EXPOSED: ICD-10-CM

## 2017-04-21 DIAGNOSIS — L97.512 DIABETIC ULCER OF TOE OF RIGHT FOOT ASSOCIATED WITH TYPE 2 DIABETES MELLITUS, WITH FAT LAYER EXPOSED: ICD-10-CM

## 2017-04-21 DIAGNOSIS — E11.42 DIABETIC POLYNEUROPATHY ASSOCIATED WITH TYPE 2 DIABETES MELLITUS: Primary | ICD-10-CM

## 2017-04-21 PROCEDURE — 99499 UNLISTED E&M SERVICE: CPT | Mod: S$GLB,,, | Performed by: PODIATRIST

## 2017-04-21 PROCEDURE — 11042 DBRDMT SUBQ TIS 1ST 20SQCM/<: CPT | Mod: S$GLB,,, | Performed by: PODIATRIST

## 2017-04-21 PROCEDURE — 99999 PR PBB SHADOW E&M-EST. PATIENT-LVL IV: CPT | Mod: PBBFAC,,, | Performed by: PODIATRIST

## 2017-04-21 NOTE — MR AVS SNAPSHOT
[facility]  [address]  [facility] [address]  Phone: [phone]                  Jesús Olvera   2017 8:00 AM   Office Visit    Description:  Male : 1945   Provider:  Johnny Wilson DPM   Department:  [facility]           Reason for Visit     Foot Ulcer     Follow-up           Diagnoses this Visit        Comments    Diabetic polyneuropathy associated with type 2 diabetes mellitus    -  Primary     PVD (peripheral vascular disease)                To Do List           Future Appointments        Provider Department Dept Phone    2017 9:00 AM MD [facility] - Neurosurgery 7th Fl [phone]    2017 8:00 AM [facility] - Podiatry [phone]    2017 10:00 AM [facility], [facility] - Laboratory [phone]    2017 11:20 AM Kingston Thomson MD [facility] [address] - Nephrology [phone]      Goals (5 Years of Data)     None      [facility]     [facility] -  Assistance  Unless otherwise directed by your provider, please contact [facility], our nurse care line that is available for  assistance.     Registered nurses in the [facility] Center provide: appointment scheduling, clinical advisement, health education, and other advisory services.  Call: [phone] (toll free)               Medications           Message regarding Medications     Verify the changes and/or additions to your medication regime listed below are the same as discussed with your clinician today.  If any of these changes or additions are incorrect, please notify your healthcare provider.             Verify that the below list of medications is an accurate representation of the medications you are currently taking.  If none reported, the list may be blank. If incorrect, please contact your healthcare provider. Carry this list with you in case of emergency.           Current Medications     amlodipine (NORVASC) 10 MG  "tablet Take 10 mg by mouth once daily.      aspirin 81 MG Chew Take 81 mg by mouth every evening.     BD INSULIN SYRINGE ULTRA-FINE 1/2 mL 31 x 5/16" Syrg     carvedilol (COREG) 25 MG tablet Take 2 tablets (50 mg total) by mouth 2 (two) times daily with meals.    cholecalciferol, vitamin D3, 1,000 unit capsule Take 2 capsules (2,000 Units total) by mouth once daily.    dorzolamide-timolol 2-0.5% (COSOPT) 22.3-6.8 mg/mL ophthalmic solution     ferrous sulfate 325 (65 FE) MG EC tablet Take 1 tablet (325 mg total) by mouth once daily.    gabapentin (NEURONTIN) 300 MG capsule Take 300 mg by mouth 3 (three) times daily.    hydrALAZINE (APRESOLINE) 10 MG tablet Take 1 tablet (10 mg total) by mouth every 12 (twelve) hours.    insulin aspart (NOVOLOG) 100 unit/mL injection Inject 10 Units into the skin 3 (three) times daily with meals.    LANTUS 100 unit/mL injection Inject 20 Units into the skin 2 (two) times daily.    LUMIGAN 0.01 % Drop Place 1 drop into both eyes every evening.     multivitamin capsule Take 1 capsule by mouth once daily.    mycophenolate (CELLCEPT) 250 mg Cap Take 2 capsules (500 mg total) by mouth 2 (two) times daily.    omeprazole (PRILOSEC) 20 MG capsule Take 2 capsules (40 mg total) by mouth once daily.    oxycodone-acetaminophen (LYNOX) 7.5-300 mg per tablet Take 1 tablet by mouth every 6 (six) hours as needed for Pain.    oxycodone-acetaminophen (PERCOCET) 7.5-325 mg per tablet Take 1 tablet by mouth every 4 (four) hours as needed for Pain.    predniSONE (DELTASONE) 5 MG tablet Take 5 mg by mouth once daily.      senna-docusate 8.6-50 mg (PERICOLACE) 8.6-50 mg per tablet Take 1 tablet by mouth 2 (two) times daily as needed for Constipation.    simvastatin (ZOCOR) 40 MG tablet 40 mg every evening.     sodium bicarbonate 650 MG tablet TAKE 1 TABLET (650 MG TOTAL) BY MOUTH ONCE DAILY.    tacrolimus (PROGRAF) 1 MG Cap Take 2 capsules (2 mg total) by mouth every 12 (twelve) hours.    tamsulosin " "(FLOMAX) 0.4 mg Cp24 take 1 capsule by mouth once daily    valsartan (DIOVAN) 160 MG tablet Take 160 mg by mouth once daily.    vitamin renal formula, B-complex-vitamin c-folic acid, (TRIPHROCAPS) 1 mg Cap Take 1 capsule by mouth once daily.           Clinical Reference Information           Your Vitals Were     BP Pulse Height             159/70 62 6' 2" (1.88 m)         Blood Pressure          Most Recent Value    BP  (!)  159/70      Allergies as of 4/21/2017     No Known Allergies      Immunizations Administered on Date of Encounter - 4/21/2017     None      Maintenance Dialysis History     Patient has no recorded history of maintenance dialysis.      Transplant Information        Txp Date Organ Coordinator Care Team    8/2/2004 Kidney Giselle Chavez RN Surgeon:  Ilya Yu MD   Current Nephrologist:  Kingston Thomson MD         Language Assistance Services     ATTENTION: Language assistance services are available, free of charge. Please call [phone].      ATENCIÓN: Si habla español, tiene a suarez disposición servicios gratuitos de asistencia lingüística. Llame al [phone].     [facility] Ý: N?u b?n nói Ti?ng Vi?t, có các d?ch v? h? tr? ngôn ng? mi?n phí dành cho b?n. G?i s? [phone].         [facility] complies with applicable Federal civil rights laws and does not discriminate on the basis of race, color, national origin, age, disability, or sex.        "

## 2017-04-21 NOTE — PROGRESS NOTES
Subjective:      Patient ID: Jesús Olvera is a 71 y.o. male.    Chief Complaint: Foot Ulcer (PCP Dr. Joiner 16) and Follow-up    Jesús is a 71 y.o. male who presents to the clinic for evaluation and treatment of high risk feet. Jesús has a past medical history of Anemia associated with chronic renal failure; Arthritis; CAD (coronary atherosclerotic disease); CHF (congestive heart failure); -donor kidney transplant (2004); Diabetes mellitus; Encounter for blood transfusion; GIB (gastrointestinal bleeding) (10/23/2014); Neurogenic bladder; Osteomyelitis of lumbar vertebra (10/20/2014); PVD (peripheral vascular disease); Renal hypertension; Secondary hyperparathyroidism of renal origin; Secondary myopathy; and Thyroid nodule (2016). The patient's chief complaint is diabetic foot ulcer, R foot. This patient has documented high risk feet requiring routine maintenance secondary to diabetes mellitis and those secondary complications of diabetes, as mentioned.  Denies injury. He has been applying iodosorb daily. Denies drainage or swelling at toe. He is just starting to walk a little with PT.     PCP: Dante Joiner MD    Date Last Seen by PCP:   Chief Complaint   Patient presents with    Foot Ulcer     PCP Dr. Joiner 16    Follow-up         Current shoe gear:  Affected Foot: Rx diabetic extra depth shoes and custom accommodative insoles     Unaffected Foot: Rx diabetic extra depth shoes and custom accommodative insoles    History of Trauma: negative  Sign of Infection: none    Hemoglobin A1C   Date Value Ref Range Status   06/10/2016 7.7 (H) 4.5 - 6.2 % Final   10/17/2014 7.9 (H) 4.5 - 6.2 % Final   2012 8.7 (H) 4.0 - 6.2 % Final         Review of Systems   Constitution: Negative for chills, fever and night sweats.   Cardiovascular: Positive for leg swelling. Negative for chest pain.   Respiratory: Negative for shortness of breath.    Skin: Positive for poor wound  healing.   Musculoskeletal: Negative for joint pain and joint swelling.        L foot partial amputation.    Gastrointestinal: Negative for diarrhea, nausea and vomiting.   Neurological: Positive for numbness and paresthesias.         Objective:      Physical Exam   Constitutional: He is oriented to person, place, and time. He appears well-developed and well-nourished. No distress.   Cardiovascular: Normal rate and intact distal pulses.    Musculoskeletal: Normal range of motion. He exhibits edema (Mild). He exhibits no tenderness.   L transmetatarsal amputation   Flexor contracture at DIP and PIP at lesser toes, R.   Neurological: He is alert and oriented to person, place, and time. He exhibits normal muscle tone.   Neurologic: Sharp/dull sensation absent L foot.   Skin: Skin is warm and dry. No rash noted. He is not diaphoretic. No erythema. No pallor.   Ulcer Location: distal 2nd toe  Measurements: 0.3 x 0.2 x 0.1 cm  Periwound: Intact  Drainage: none  Pus: None.  Malodor: None.  Base:  Fibro ganular  Signs of infection: None.     Toenails 1-5, right are elongated, thickened by 2-3 mm, discolored/yellowed, dystrophic, brittle with subungual debris.     Psychiatric: He has a normal mood and affect. His behavior is normal. Judgment and thought content normal.   Nursing note and vitals reviewed.          Assessment:       Encounter Diagnoses   Name Primary?    Diabetic polyneuropathy associated with type 2 diabetes mellitus Yes    PVD (peripheral vascular disease)     Diabetic ulcer of toe of right foot associated with type 2 diabetes mellitus, with fat layer exposed    This patient has documented high risk feet requiring routine maintenance secondary to diabetes mellitis and those secondary complications of diabetes, as mentioned.        Plan:       Jesús was seen today for foot ulcer and follow-up.    Diagnoses and all orders for this visit:    Diabetic polyneuropathy associated with type 2 diabetes  mellitus    PVD (peripheral vascular disease)    Diabetic ulcer of toe of right foot associated with type 2 diabetes mellitus, with fat layer exposed  - Patient was given written and verbal instructions regarding foot condition.  I counseled the patient on his conditions, their implications and medical management.    - The wound is cleansed as much as possible and dressed with Kalee and Mepelex border. The patient is alerted to watch for any signs of infection (redness, pus, pain, increased swelling or fever) and call if such occurs. Home wound care instructions are provided.     - Offload with surgical shoe and crest pad.   - Iodosorb daily.    - With patient's permission, Shawna Shah MA assisted me with the application of a football dressing R foot under my direct supervision. Darco shoe applied and patient instructed to never walk without the Darco shoe on the foot.     - Obtained verbal consent from the patient for excisional wound debridement, right 2nd toe. No anesthesia was required, Utilizing a sterile curet, all non-viable soft tissue was excised to level of health subcutaneous tissue. A healthy appearing wound base was achieved with minimal blood loss. Hemostasis achieved with compression. Wound site was irrigated with normal saline and dressed. Wound care instructions were reviewed with the patient. Patient tolerated the procedure well.    Shoe inspection. Diabetic Foot Education. Patient reminded of the importance of good nutrition and blood sugar control to help prevent podiatric complications of diabetes. Patient instructed on proper foot hygeine. We discussed wearing proper shoe gear, daily foot inspections, never walking without protective shoe gear, never putting sharp instruments to feet      - f/u 2 weeks or sooner prn.  No Follow-up on file.

## 2017-04-25 ENCOUNTER — OFFICE VISIT (OUTPATIENT)
Dept: NEUROSURGERY | Facility: CLINIC | Age: 72
End: 2017-04-25
Payer: MEDICARE

## 2017-04-25 VITALS
TEMPERATURE: 98 F | SYSTOLIC BLOOD PRESSURE: 144 MMHG | WEIGHT: 210 LBS | HEIGHT: 74 IN | DIASTOLIC BLOOD PRESSURE: 68 MMHG | BODY MASS INDEX: 26.95 KG/M2 | HEART RATE: 60 BPM

## 2017-04-25 DIAGNOSIS — M41.50 DEGENERATIVE SCOLIOSIS IN ADULT PATIENT: Primary | ICD-10-CM

## 2017-04-25 PROCEDURE — 3078F DIAST BP <80 MM HG: CPT | Mod: S$GLB,,, | Performed by: NEUROLOGICAL SURGERY

## 2017-04-25 PROCEDURE — 1160F RVW MEDS BY RX/DR IN RCRD: CPT | Mod: S$GLB,,, | Performed by: NEUROLOGICAL SURGERY

## 2017-04-25 PROCEDURE — 99999 PR PBB SHADOW E&M-EST. PATIENT-LVL III: CPT | Mod: PBBFAC,,, | Performed by: NEUROLOGICAL SURGERY

## 2017-04-25 PROCEDURE — 99214 OFFICE O/P EST MOD 30 MIN: CPT | Mod: S$GLB,,, | Performed by: NEUROLOGICAL SURGERY

## 2017-04-25 PROCEDURE — 1159F MED LIST DOCD IN RCRD: CPT | Mod: S$GLB,,, | Performed by: NEUROLOGICAL SURGERY

## 2017-04-25 PROCEDURE — 3077F SYST BP >= 140 MM HG: CPT | Mod: S$GLB,,, | Performed by: NEUROLOGICAL SURGERY

## 2017-04-25 PROCEDURE — 99499 UNLISTED E&M SERVICE: CPT | Mod: S$GLB,,, | Performed by: NEUROLOGICAL SURGERY

## 2017-04-25 NOTE — LETTER
April 25, 2017      Dante Joiner MD  [address]  [address]  [facility] [address]           [facility] 7[facility] Fl  [address]  [facility] [address]  Phone: [phone]          Patient: Jesús Olvera   MR Number: [id number]   YOB: 1945   Date of Visit: 4/25/2017       Dear Dr. Dante Joiner:    Thank you for referring Jesús Olvera to me for evaluation. Attached you will find relevant portions of my assessment and plan of care.    If you have questions, please do not hesitate to call me. I look forward to following Jesús Olvera along with you.    Sincerely,    William Maxwell MD    Enclosure  CC:  No Recipients    If you would like to receive this communication electronically, please contact [email or web address] or [phone] to request more information on [employer] access.    For providers and/or their staff who would like to refer a patient to [facility], please contact us through our one-stop-shop provider referral line, [facility], at [phone].    If you feel you have received this communication in error or would no longer like to receive these types of communications, please e-mail [email or web address]

## 2017-04-25 NOTE — PROGRESS NOTES
CHIEF COMPLAINT:  Follow up    I, Joanne Edwards, am scribing for, and in the presence of, Dr. Maxwell.    HPI:  Jesús Olvera is a 71 y.o.  male with a history of lumbar osteomyelitis, peripheral vascular disease, CAD, CHF, kidney transplant, diabetes mellitus, and on Aspirin, who presents today for a 3 month follow up evaluation of low back pain. Pt reports that he is doing well and notes improvement in low back pain. Pt has been using a walker for 2-3 weeks. Pt has participated in physical therapy. Pt is no longer wearing his brace. Pt presents today in a wheelchair. Pt is no longer taking percocet and notes that his pain is well tolerated with Tylenol. Pt notes intermittent weakness in his RLE. Pt denies any shooting pain. Pt reports mild pain in his posterior BLE, right worse than left; however, pt states the pain is tolerable. Pt is taking vitamin D supplements for his osteopenia. Pt denies fever and chills.     Review of patient's allergies indicates:  No Known Allergies    Past Medical History:   Diagnosis Date    Anemia associated with chronic renal failure     Arthritis     CAD (coronary atherosclerotic disease)     CHF (congestive heart failure)     -donor kidney transplant 2004    Diabetes mellitus     Encounter for blood transfusion     GIB (gastrointestinal bleeding) 10/23/2014    Neurogenic bladder     Osteomyelitis of lumbar vertebra 10/20/2014    PVD (peripheral vascular disease)     Heel ulcers with osteomyelitis, subsequent hemipedectomy on the left      Renal hypertension     Secondary hyperparathyroidism of renal origin     Secondary myopathy     steriod induced    Thyroid nodule 2016     Past Surgical History:   Procedure Laterality Date    BACK SURGERY      EYE SURGERY      hemipedectomy      left foot    KIDNEY TRANSPLANT  04    VASCULAR SURGERY       Family History   Problem Relation Age of Onset    Alzheimer's disease Mother     Diabetes  Mother     Heart disease Father     Diabetes Sister     Diabetes Brother      Social History   Substance Use Topics    Smoking status: Former Smoker     Quit date: 3/14/1974    Smokeless tobacco: Never Used    Alcohol use No      Comment: stopped 25yrs ago        Review of Systems   HENT: Negative.    Eyes: Negative.    Respiratory: Negative.    Cardiovascular: Negative.    Gastrointestinal: Negative.    Endocrine: Negative.    Genitourinary: Negative.    Musculoskeletal: Positive for gait problem (walker) and myalgias (intermittent BLE). Negative for back pain and neck pain.   Skin: Negative.    Allergic/Immunologic: Negative.    Neurological: Positive for weakness (intermittent in RLE). Negative for light-headedness, numbness and headaches.   Hematological: Negative.    Psychiatric/Behavioral: Negative.        OBJECTIVE:   Vital Signs:  Temp: 98.2 °F (36.8 °C) (04/25/17 0847)  Pulse: 60 (04/25/17 0847)  BP: (!) 144/68 (04/25/17 0847)    Physical Exam:    Vital signs: All nursing notes and vital signs reviewed -- afebrile, vital signs stable.  Constitutional: Patient sitting comfortably in chair. Appears well developed and well nourished.  Skin: Exposed areas are intact without abnormal markings, rashes or other lesions. Well healed scar lumbosacral region.  HEENT: Normocephalic. Normal conjunctivae.  Cardiovascular: Normal rate and regular rhythm.  Respiratory: Chest wall rises and falls symmetrically, without signs of respiratory distress.  Abdomen: Soft and non-tender.  Extremities: Calves supple, non-tender. 2+ pitting edema in LLE. 1+ pitting edema in RLE.  Psych/Behavior: Normal affect.    Generalized muscle atrophy in the right leg.    Neurological:    Mental status: Alert and oriented. Conversational and appropriate.       Cranial Nerves: Grossly intact.     Motor:    Upper:  Deltoids Triceps Biceps WE WF     R 5/5 5/5 5/5 5/5 5/5 5/5    L 5/5 5/5 5/5 5/5 5/5 5/5      Lower:  HF KE KF DF PF EHL     R 4-/5 4+/5 5/5 N/A* N/A* 2/5    L 4+/5 5/5 5/5 5/5 5/5 5/5     Partial amputation left foot. Cast on right foot.*    Sensory: Intact sensation to light touch in all extremities. Romberg negative.    Reflexes:          DTR: 2+ symmetrically throughout.     Araujo's: Negative.     Babinski's: Negative.     Clonus: Negative.    Cerebellar: Finger-to-nose and rapid alternating movements normal. Gait stable, fluid.    Spine:    Posture: Head well aligned over pelvis in front and side views.  No focal or global spinal deformity visible on inspection. Shoulders and hips even. No obvious leg length discrepancy. No scapula winging.    Bending: Full ROM with forward, back and lateral bending. No rib prominence with forward bend.    Cervical:      ROM: Full with flexion, extension, lateral rotation and ear-to-shoulder bend.      Midline TTP: Negative.     Spurling's test: Negative.     Lhermitte's: Negative.    Thoracic:     Midline TTP: Negative    Lumbar:     Midline TTP: Negative     Straight Leg Test: Negative     Crossed Straight Leg Test: Negative     Sciatic notch tenderness: Negative.    Other:     SI joint TTP: Negative.     Greater trochanter TTP: Negative.     Tenderness with external/internal hip rotation: Negative.    Diagnostic Results:  All imaging was independently reviewed by me.    DXA Bone Scan, dated 2/8/2017:  1. Mild osteopenia  2. T score at the hip negative 1.2    Dynamic X-ray, dated 1/24/2017:   1. No evidence of instability    CT L-spine, dated 9/15/2016:  1. Screw leucencies in bilateral L3 screws and bilateral S1 screws  2. Advanced degeneration of L2 vertebral body, superior L3 vertebral, and L2 disc space     Scoliosis standing AP and Lateral X-ray, dated 9/27/2016:  1. Proximal junctional kyphosis above L3 measuring 18 degrees  2. Great than 10 cm positive sagittal balance  3. Less than 5 cm positive right global coronal balance    ASSESSMENT/PLAN:     Jesús Olvera continues to do well  after participating in PT. His back pain is tolerable, down to only requiring tylenol. He is ambulating with a rolling walker which is a great improvement from last visit. His strength is also slowly improving. He will begin Prolia vs Forteo therapy soon. At this time we will continue the course, we will see him back in 6 months with scoliosis x-rays.    The patient understands and agrees with the plan of care. All questions were answered.     1. RTC 6 months with scoliosis X-ray     I, Dr. Maxwell, personally performed the services described in this documentation as scribed by Joanne Edwards in my presence, and it is both accurate and complete.      William Maxwell M.D.  Department of Neurosurgery  [facility]

## 2017-05-05 ENCOUNTER — OFFICE VISIT (OUTPATIENT)
Dept: PODIATRY | Facility: CLINIC | Age: 72
End: 2017-05-05
Payer: MEDICARE

## 2017-05-05 VITALS — SYSTOLIC BLOOD PRESSURE: 159 MMHG | DIASTOLIC BLOOD PRESSURE: 69 MMHG | HEIGHT: 74 IN | HEART RATE: 60 BPM

## 2017-05-05 DIAGNOSIS — L97.512 DIABETIC ULCER OF TOE OF RIGHT FOOT ASSOCIATED WITH TYPE 2 DIABETES MELLITUS, WITH FAT LAYER EXPOSED: ICD-10-CM

## 2017-05-05 DIAGNOSIS — E11.42 DIABETIC POLYNEUROPATHY ASSOCIATED WITH TYPE 2 DIABETES MELLITUS: Primary | ICD-10-CM

## 2017-05-05 DIAGNOSIS — E11.621 DIABETIC ULCER OF TOE OF RIGHT FOOT ASSOCIATED WITH TYPE 2 DIABETES MELLITUS, WITH FAT LAYER EXPOSED: ICD-10-CM

## 2017-05-05 DIAGNOSIS — I73.9 PVD (PERIPHERAL VASCULAR DISEASE): ICD-10-CM

## 2017-05-05 PROCEDURE — 99499 UNLISTED E&M SERVICE: CPT | Mod: S$GLB,,, | Performed by: PODIATRIST

## 2017-05-05 PROCEDURE — 99999 PR PBB SHADOW E&M-EST. PATIENT-LVL III: CPT | Mod: PBBFAC,,, | Performed by: PODIATRIST

## 2017-05-05 PROCEDURE — 11042 DBRDMT SUBQ TIS 1ST 20SQCM/<: CPT | Mod: S$GLB,,, | Performed by: PODIATRIST

## 2017-05-05 NOTE — PROGRESS NOTES
Subjective:      Patient ID: Jesús Olvera is a 71 y.o. male.    Chief Complaint: Peripheral Neuropathy (PCP Dr. Joiner 16); Foot Ulcer; Follow-up; Wound Care; and Dressing Change    Jesús is a 71 y.o. male who presents to the clinic for evaluation and treatment of high risk feet. Jesús has a past medical history of Anemia associated with chronic renal failure; Arthritis; CAD (coronary atherosclerotic disease); CHF (congestive heart failure); -donor kidney transplant (2004); Diabetes mellitus; Encounter for blood transfusion; GIB (gastrointestinal bleeding) (10/23/2014); Neurogenic bladder; Osteomyelitis of lumbar vertebra (10/20/2014); PVD (peripheral vascular disease); Renal hypertension; Secondary hyperparathyroidism of renal origin; Secondary myopathy; and Thyroid nodule (2016). The patient's chief complaint is diabetic foot ulcer, R foot. This patient has documented high risk feet requiring routine maintenance secondary to diabetes mellitis and those secondary complications of diabetes, as mentioned.  Denies injury. He has been applying iodosorb daily. Denies drainage or swelling at toe. He is just starting to walk a little with PT.     PCP: Dante Joiner MD    Date Last Seen by PCP:   Chief Complaint   Patient presents with    Peripheral Neuropathy     PCP Dr. Joiner 16    Foot Ulcer    Follow-up    Wound Care    Dressing Change         Current shoe gear:  Affected Foot: Rx diabetic extra depth shoes and custom accommodative insoles     Unaffected Foot: Rx diabetic extra depth shoes and custom accommodative insoles    History of Trauma: negative  Sign of Infection: none    Hemoglobin A1C   Date Value Ref Range Status   06/10/2016 7.7 (H) 4.5 - 6.2 % Final   10/17/2014 7.9 (H) 4.5 - 6.2 % Final   2012 8.7 (H) 4.0 - 6.2 % Final         Review of Systems   Constitution: Negative for chills, fever and night sweats.   Cardiovascular: Positive for leg  swelling. Negative for chest pain.   Respiratory: Negative for shortness of breath.    Skin: Positive for poor wound healing.   Musculoskeletal: Negative for joint pain and joint swelling.        L foot partial amputation.    Gastrointestinal: Negative for diarrhea, nausea and vomiting.   Neurological: Positive for numbness and paresthesias.         Objective:      Physical Exam   Constitutional: He is oriented to person, place, and time. He appears well-developed and well-nourished. No distress.   Cardiovascular: Normal rate and intact distal pulses.    Musculoskeletal: Normal range of motion. He exhibits edema (Mild). He exhibits no tenderness.   L transmetatarsal amputation   Flexor contracture at DIP and PIP at lesser toes, R.   Neurological: He is alert and oriented to person, place, and time. He exhibits normal muscle tone.   Neurologic: Sharp/dull sensation absent L foot.   Skin: Skin is warm and dry. No rash noted. He is not diaphoretic. No erythema. No pallor.   Ulcer Location: distal 2nd toe  Measurements: 0.2 x 0.2 x 0.1 cm  Periwound: Intact  Drainage: none  Pus: None.  Malodor: None.  Base:  Fibro ganular  Signs of infection: None.        Psychiatric: He has a normal mood and affect. His behavior is normal. Judgment and thought content normal.   Nursing note and vitals reviewed.          Assessment:       Encounter Diagnoses   Name Primary?    Diabetic polyneuropathy associated with type 2 diabetes mellitus Yes    Diabetic ulcer of toe of right foot associated with type 2 diabetes mellitus, with fat layer exposed     PVD (peripheral vascular disease)    This patient has documented high risk feet requiring routine maintenance secondary to diabetes mellitis and those secondary complications of diabetes, as mentioned.        Plan:       Jesús was seen today for peripheral neuropathy, foot ulcer, follow-up, wound care and dressing change.    Diagnoses and all orders for this visit:    Diabetic  polyneuropathy associated with type 2 diabetes mellitus    Diabetic ulcer of toe of right foot associated with type 2 diabetes mellitus, with fat layer exposed    PVD (peripheral vascular disease)    - Patient was given written and verbal instructions regarding foot condition.  I counseled the patient on his conditions, their implications and medical management.    - The wound is cleansed as much as possible and dressed with Kalee and Mepelex border. The patient is alerted to watch for any signs of infection (redness, pus, pain, increased swelling or fever) and call if such occurs. Home wound care instructions are provided.     - Keep dressing intact for a week then  - Offload with surgical shoe and crest pad.   - Iodosorb daily.    - With patient's permission, Shawna Shah MA assisted me with the application of a football dressing R foot under my direct supervision. Darco shoe applied and patient instructed to never walk without the Darco shoe on the foot.     - Obtained verbal consent from the patient for excisional wound debridement, right 2nd toe. No anesthesia was required, Utilizing a sterile curet, all non-viable soft tissue was excised to level of health subcutaneous tissue. A healthy appearing wound base was achieved with minimal blood loss. Hemostasis achieved with compression. Wound site was irrigated with normal saline and dressed. Wound care instructions were reviewed with the patient. Patient tolerated the procedure well.    Shoe inspection. Diabetic Foot Education. Patient reminded of the importance of good nutrition and blood sugar control to help prevent podiatric complications of diabetes. Patient instructed on proper foot hygeine. We discussed wearing proper shoe gear, daily foot inspections, never walking without protective shoe gear, never putting sharp instruments to feet      - f/u 2 weeks or sooner prn.  No Follow-up on file.

## 2017-05-08 ENCOUNTER — LAB VISIT (OUTPATIENT)
Dept: LAB | Facility: HOSPITAL | Age: 72
End: 2017-05-08
Attending: FAMILY MEDICINE
Payer: MEDICARE

## 2017-05-08 DIAGNOSIS — S32.000S COMPRESSION FRACTURE OF LUMBAR VERTEBRA, SEQUELA: ICD-10-CM

## 2017-05-08 LAB
25(OH)D3+25(OH)D2 SERPL-MCNC: 36 NG/ML
ALBUMIN SERPL BCP-MCNC: 3.5 G/DL
ANION GAP SERPL CALC-SCNC: 5 MMOL/L
BUN SERPL-MCNC: 30 MG/DL
CALCIUM SERPL-MCNC: 9.8 MG/DL
CHLORIDE SERPL-SCNC: 107 MMOL/L
CO2 SERPL-SCNC: 30 MMOL/L
CREAT SERPL-MCNC: 1.1 MG/DL
EST. GFR  (AFRICAN AMERICAN): >60 ML/MIN/1.73 M^2
EST. GFR  (NON AFRICAN AMERICAN): >60 ML/MIN/1.73 M^2
GLUCOSE SERPL-MCNC: 87 MG/DL
PHOSPHATE SERPL-MCNC: 3.5 MG/DL
POTASSIUM SERPL-SCNC: 4.7 MMOL/L
PTH-INTACT SERPL-MCNC: 76 PG/ML
SODIUM SERPL-SCNC: 142 MMOL/L

## 2017-05-08 PROCEDURE — 83970 ASSAY OF PARATHORMONE: CPT

## 2017-05-08 PROCEDURE — 82306 VITAMIN D 25 HYDROXY: CPT

## 2017-05-08 PROCEDURE — 80069 RENAL FUNCTION PANEL: CPT

## 2017-05-08 PROCEDURE — 36415 COLL VENOUS BLD VENIPUNCTURE: CPT | Mod: PO

## 2017-05-09 ENCOUNTER — TELEPHONE (OUTPATIENT)
Dept: ENDOCRINOLOGY | Facility: HOSPITAL | Age: 72
End: 2017-05-09

## 2017-05-09 DIAGNOSIS — E21.3 HYPERPARATHYROIDISM: Primary | ICD-10-CM

## 2017-05-11 DIAGNOSIS — N18.30 CHRONIC KIDNEY DISEASE, STAGE III (MODERATE): ICD-10-CM

## 2017-05-15 RX ORDER — SODIUM BICARBONATE 650 MG/1
TABLET ORAL
Refills: 3 | COMMUNITY
Start: 2017-05-15 | End: 2017-12-13 | Stop reason: SDUPTHER

## 2017-05-15 RX ORDER — MYCOPHENOLATE MOFETIL 250 MG/1
CAPSULE ORAL
Qty: 120 CAPSULE | Refills: 11 | Status: SHIPPED | OUTPATIENT
Start: 2017-05-15

## 2017-05-17 ENCOUNTER — TELEPHONE (OUTPATIENT)
Dept: NEPHROLOGY | Facility: CLINIC | Age: 72
End: 2017-05-17

## 2017-05-19 ENCOUNTER — OFFICE VISIT (OUTPATIENT)
Dept: PODIATRY | Facility: CLINIC | Age: 72
End: 2017-05-19
Payer: MEDICARE

## 2017-05-19 ENCOUNTER — TELEPHONE (OUTPATIENT)
Dept: NEPHROLOGY | Facility: CLINIC | Age: 72
End: 2017-05-19

## 2017-05-19 ENCOUNTER — LAB VISIT (OUTPATIENT)
Dept: LAB | Facility: HOSPITAL | Age: 72
End: 2017-05-19
Attending: INTERNAL MEDICINE
Payer: MEDICARE

## 2017-05-19 VITALS
HEART RATE: 59 BPM | WEIGHT: 210 LBS | SYSTOLIC BLOOD PRESSURE: 134 MMHG | HEIGHT: 74 IN | DIASTOLIC BLOOD PRESSURE: 67 MMHG | BODY MASS INDEX: 26.95 KG/M2

## 2017-05-19 DIAGNOSIS — E11.42 DIABETIC POLYNEUROPATHY ASSOCIATED WITH TYPE 2 DIABETES MELLITUS: Primary | ICD-10-CM

## 2017-05-19 DIAGNOSIS — Z94.0 KIDNEY REPLACED BY TRANSPLANT: ICD-10-CM

## 2017-05-19 DIAGNOSIS — E11.621 DIABETIC ULCER OF TOE OF RIGHT FOOT ASSOCIATED WITH TYPE 2 DIABETES MELLITUS, WITH FAT LAYER EXPOSED: ICD-10-CM

## 2017-05-19 DIAGNOSIS — Z89.432 S/P TRANSMETATARSAL AMPUTATION OF FOOT, LEFT: ICD-10-CM

## 2017-05-19 DIAGNOSIS — L97.512 DIABETIC ULCER OF TOE OF RIGHT FOOT ASSOCIATED WITH TYPE 2 DIABETES MELLITUS, WITH FAT LAYER EXPOSED: ICD-10-CM

## 2017-05-19 DIAGNOSIS — I73.9 PVD (PERIPHERAL VASCULAR DISEASE): ICD-10-CM

## 2017-05-19 LAB
ALBUMIN SERPL BCP-MCNC: 3.7 G/DL
ANION GAP SERPL CALC-SCNC: 9 MMOL/L
BUN SERPL-MCNC: 30 MG/DL
CALCIUM SERPL-MCNC: 9.5 MG/DL
CHLORIDE SERPL-SCNC: 108 MMOL/L
CO2 SERPL-SCNC: 22 MMOL/L
CREAT SERPL-MCNC: 1.2 MG/DL
EST. GFR  (AFRICAN AMERICAN): >60 ML/MIN/1.73 M^2
EST. GFR  (NON AFRICAN AMERICAN): >60 ML/MIN/1.73 M^2
GLUCOSE SERPL-MCNC: 105 MG/DL
PHOSPHATE SERPL-MCNC: 3.2 MG/DL
POTASSIUM SERPL-SCNC: 5.7 MMOL/L
SODIUM SERPL-SCNC: 139 MMOL/L

## 2017-05-19 PROCEDURE — 11042 DBRDMT SUBQ TIS 1ST 20SQCM/<: CPT | Mod: S$GLB,,, | Performed by: PODIATRIST

## 2017-05-19 PROCEDURE — 99999 PR PBB SHADOW E&M-EST. PATIENT-LVL IV: CPT | Mod: PBBFAC,,, | Performed by: PODIATRIST

## 2017-05-19 PROCEDURE — 99499 UNLISTED E&M SERVICE: CPT | Mod: S$GLB,,, | Performed by: PODIATRIST

## 2017-05-19 PROCEDURE — 80069 RENAL FUNCTION PANEL: CPT

## 2017-05-19 NOTE — TELEPHONE ENCOUNTER
----- Message from Patsy Khan LPN sent at 5/18/2017  3:50 PM CDT -----  Contact: Self [phone]      ----- Message -----     From: Ofe Blake     Sent: 5/18/2017  11:12 AM       To: Berto GRIJALVA Staff    The pharmacist called to check the status of a prior authorization for mycophenolate (CELLCEPT) 250 mg Cap    [facility]/pharmacy #[id number] [place], [place] - [address]   [phone] (Phone)  [phone] (Fax)        Gave paperwork to Dr.m Thomson

## 2017-05-21 NOTE — PROGRESS NOTES
Subjective:      Patient ID: Jesús Olvera is a 72 y.o. male.    Chief Complaint: Diabetic polyneuropathy associated with type 2 diabetes arina (ulcer 2nd rt foot healed ulcer)    Jesús is a 72 y.o. male who presents to the clinic for evaluation and treatment of high risk feet. Jesús has a past medical history of Anemia associated with chronic renal failure; Arthritis; CAD (coronary atherosclerotic disease); CHF (congestive heart failure); -donor kidney transplant (2004); Diabetes mellitus; Encounter for blood transfusion; GIB (gastrointestinal bleeding) (10/23/2014); Neurogenic bladder; Osteomyelitis of lumbar vertebra (10/20/2014); PVD (peripheral vascular disease); Renal hypertension; Secondary hyperparathyroidism of renal origin; Secondary myopathy; and Thyroid nodule (2016). The patient's chief complaint is diabetic foot ulcer, R foot. This patient has documented high risk feet requiring routine maintenance secondary to diabetes mellitis and those secondary complications of diabetes, as mentioned.  Denies injury. He has been applying iodosorb daily. Denies drainage or swelling at toe. He has been unable to keep crest pad from falling off of toe.    PCP: Dante Joiner MD    Date Last Seen by PCP:   Chief Complaint   Patient presents with    Diabetic polyneuropathy associated with type 2 diabetes arina     ulcer 2nd rt foot healed ulcer         Current shoe gear:  Affected Foot: Rx diabetic extra depth shoes and custom accommodative insoles     Unaffected Foot: Rx diabetic extra depth shoes and custom accommodative insoles    History of Trauma: negative  Sign of Infection: none    Hemoglobin A1C   Date Value Ref Range Status   06/10/2016 7.7 (H) 4.5 - 6.2 % Final   10/17/2014 7.9 (H) 4.5 - 6.2 % Final   2012 8.7 (H) 4.0 - 6.2 % Final         Review of Systems   Constitution: Negative for chills, fever and night sweats.   Cardiovascular: Positive for leg swelling. Negative for  chest pain.   Respiratory: Negative for shortness of breath.    Skin: Positive for poor wound healing.   Musculoskeletal: Negative for joint pain and joint swelling.        L foot partial amputation.    Gastrointestinal: Negative for diarrhea, nausea and vomiting.   Neurological: Positive for numbness and paresthesias.         Objective:      Physical Exam   Constitutional: He is oriented to person, place, and time. He appears well-developed and well-nourished. No distress.   Cardiovascular: Normal rate and intact distal pulses.    Musculoskeletal: Normal range of motion. He exhibits edema (Mild). He exhibits no tenderness.   L transmetatarsal amputation   Flexor contracture at DIP and PIP at lesser toes, R.   Neurological: He is alert and oriented to person, place, and time. He exhibits normal muscle tone.   Neurologic: Sharp/dull sensation absent L foot.   Skin: Skin is warm and dry. No rash noted. He is not diaphoretic. No erythema. No pallor.   Ulcer Location: distal 2nd toe  Measurements: 0.2 x 0.2 x 0.1 cm  Periwound: Intact  Drainage: none  Pus: None.  Malodor: None.  Base:  Fibro ganular  Signs of infection: None.        Psychiatric: He has a normal mood and affect. His behavior is normal. Judgment and thought content normal.   Nursing note and vitals reviewed.          Assessment:       Encounter Diagnoses   Name Primary?    Diabetic polyneuropathy associated with type 2 diabetes mellitus Yes    PVD (peripheral vascular disease)     Diabetic ulcer of toe of right foot associated with type 2 diabetes mellitus, with fat layer exposed     S/P transmetatarsal amputation of foot, left    This patient has documented high risk feet requiring routine maintenance secondary to diabetes mellitis and those secondary complications of diabetes, as mentioned.        Plan:       Jesús was seen today for diabetic polyneuropathy associated with type 2 diabetes arnia.    Diagnoses and all orders for this  visit:    Diabetic polyneuropathy associated with type 2 diabetes mellitus    PVD (peripheral vascular disease)    Diabetic ulcer of toe of right foot associated with type 2 diabetes mellitus, with fat layer exposed    S/P transmetatarsal amputation of foot, left    - Patient was given written and verbal instructions regarding foot condition.  I counseled the patient on his conditions, their implications and medical management.    - The wound is cleansed as much as possible and dressed with Kalee and Mepelex border. The patient is alerted to watch for any signs of infection (redness, pus, pain, increased swelling or fever) and call if such occurs. Home wound care instructions are provided.     - Keep dressing intact for a week then  - Offload with surgical shoe and crest pad. Crest pad application reviewed and understood  - Iodosorb daily.    - With patient's permission, Shawna Shah MA assisted me with the application of a football dressing R foot under my direct supervision. Darco shoe applied and patient instructed to never walk without the Darco shoe on the foot.     - Obtained verbal consent from the patient for excisional wound debridement, right 2nd toe. No anesthesia was required, Utilizing a sterile curet, all non-viable soft tissue was excised to level of health subcutaneous tissue. A healthy appearing wound base was achieved with minimal blood loss. Hemostasis achieved with compression. Wound site was irrigated with normal saline and dressed. Wound care instructions were reviewed with the patient. Patient tolerated the procedure well.    Shoe inspection. Diabetic Foot Education. Patient reminded of the importance of good nutrition and blood sugar control to help prevent podiatric complications of diabetes. Patient instructed on proper foot hygeine. We discussed wearing proper shoe gear, daily foot inspections, never walking without protective shoe gear, never putting sharp instruments to feet      -  f/u 2 weeks or sooner prn.  No Follow-up on file.

## 2017-05-23 DIAGNOSIS — E11.9 TYPE 2 DIABETES MELLITUS WITHOUT COMPLICATION: ICD-10-CM

## 2017-05-26 ENCOUNTER — OFFICE VISIT (OUTPATIENT)
Dept: INTERNAL MEDICINE | Facility: CLINIC | Age: 72
End: 2017-05-26
Payer: MEDICARE

## 2017-05-26 ENCOUNTER — OFFICE VISIT (OUTPATIENT)
Dept: NEPHROLOGY | Facility: CLINIC | Age: 72
End: 2017-05-26
Payer: MEDICARE

## 2017-05-26 ENCOUNTER — INFUSION (OUTPATIENT)
Dept: INFECTIOUS DISEASES | Facility: HOSPITAL | Age: 72
End: 2017-05-26
Attending: INTERNAL MEDICINE
Payer: MEDICARE

## 2017-05-26 VITALS
HEART RATE: 67 BPM | OXYGEN SATURATION: 96 % | SYSTOLIC BLOOD PRESSURE: 150 MMHG | BODY MASS INDEX: 26.95 KG/M2 | HEIGHT: 74 IN | TEMPERATURE: 98 F | DIASTOLIC BLOOD PRESSURE: 82 MMHG | WEIGHT: 210 LBS

## 2017-05-26 VITALS
DIASTOLIC BLOOD PRESSURE: 90 MMHG | HEART RATE: 70 BPM | OXYGEN SATURATION: 95 % | BODY MASS INDEX: 26.95 KG/M2 | WEIGHT: 210 LBS | SYSTOLIC BLOOD PRESSURE: 160 MMHG | HEIGHT: 74 IN

## 2017-05-26 VITALS — WEIGHT: 210 LBS | HEIGHT: 74 IN | BODY MASS INDEX: 26.95 KG/M2

## 2017-05-26 DIAGNOSIS — Z29.89 NEED FOR PROPHYLACTIC IMMUNOTHERAPY: Chronic | ICD-10-CM

## 2017-05-26 DIAGNOSIS — Z89.432 STATUS POST TRANSMETATARSAL AMPUTATION OF LEFT FOOT: ICD-10-CM

## 2017-05-26 DIAGNOSIS — D84.9 IMMUNOSUPPRESSED STATUS: ICD-10-CM

## 2017-05-26 DIAGNOSIS — I70.90 ARTERIAL OCCLUSIVE DISEASE: ICD-10-CM

## 2017-05-26 DIAGNOSIS — M80.00XD AGE-RELATED OSTEOPOROSIS WITH CURRENT PATHOLOGICAL FRACTURE WITH ROUTINE HEALING, SUBSEQUENT ENCOUNTER: Primary | ICD-10-CM

## 2017-05-26 DIAGNOSIS — Z94.0 DECEASED-DONOR KIDNEY TRANSPLANT: Chronic | ICD-10-CM

## 2017-05-26 DIAGNOSIS — Z94.0 DECEASED-DONOR KIDNEY TRANSPLANT RECIPIENT: ICD-10-CM

## 2017-05-26 DIAGNOSIS — N25.81 SECONDARY HYPERPARATHYROIDISM OF RENAL ORIGIN: ICD-10-CM

## 2017-05-26 DIAGNOSIS — E11.22 CONTROLLED TYPE 2 DIABETES MELLITUS WITH STAGE 3 CHRONIC KIDNEY DISEASE, WITH LONG-TERM CURRENT USE OF INSULIN: Primary | ICD-10-CM

## 2017-05-26 DIAGNOSIS — M80.00XD AGE-RELATED OSTEOPOROSIS WITH CURRENT PATHOLOGICAL FRACTURE WITH ROUTINE HEALING, SUBSEQUENT ENCOUNTER: ICD-10-CM

## 2017-05-26 DIAGNOSIS — E11.649 HYPOGLYCEMIA ASSOCIATED WITH DIABETES: ICD-10-CM

## 2017-05-26 DIAGNOSIS — N25.81 HYPERPARATHYROIDISM DUE TO RENAL INSUFFICIENCY: ICD-10-CM

## 2017-05-26 DIAGNOSIS — E10.42 TYPE 1 DIABETES MELLITUS WITH DIABETIC POLYNEUROPATHY: ICD-10-CM

## 2017-05-26 DIAGNOSIS — Z00.00 ENCOUNTER FOR PREVENTIVE HEALTH EXAMINATION: Primary | ICD-10-CM

## 2017-05-26 DIAGNOSIS — I50.32 CHRONIC DIASTOLIC CONGESTIVE HEART FAILURE: ICD-10-CM

## 2017-05-26 DIAGNOSIS — N18.30 CKD (CHRONIC KIDNEY DISEASE), STAGE III: ICD-10-CM

## 2017-05-26 DIAGNOSIS — Z79.4 CONTROLLED TYPE 2 DIABETES MELLITUS WITH STAGE 3 CHRONIC KIDNEY DISEASE, WITH LONG-TERM CURRENT USE OF INSULIN: Primary | ICD-10-CM

## 2017-05-26 DIAGNOSIS — N18.30 CONTROLLED TYPE 2 DIABETES MELLITUS WITH STAGE 3 CHRONIC KIDNEY DISEASE, WITH LONG-TERM CURRENT USE OF INSULIN: Primary | ICD-10-CM

## 2017-05-26 DIAGNOSIS — N18.30 CHRONIC KIDNEY DISEASE, STAGE III (MODERATE): ICD-10-CM

## 2017-05-26 DIAGNOSIS — I73.9 PVD (PERIPHERAL VASCULAR DISEASE): Chronic | ICD-10-CM

## 2017-05-26 DIAGNOSIS — I12.9 HYPERTENSIVE KIDNEY DISEASE WITH CHRONIC KIDNEY DISEASE, STAGE 1-4 OR UNSPECIFIED CHRONIC KIDNEY DISEASE: ICD-10-CM

## 2017-05-26 DIAGNOSIS — M17.0 PRIMARY OSTEOARTHRITIS OF BOTH KNEES: ICD-10-CM

## 2017-05-26 PROCEDURE — G0439 PPPS, SUBSEQ VISIT: HCPCS | Mod: S$GLB,,, | Performed by: NURSE PRACTITIONER

## 2017-05-26 PROCEDURE — 99499 UNLISTED E&M SERVICE: CPT | Mod: S$GLB,,, | Performed by: INTERNAL MEDICINE

## 2017-05-26 PROCEDURE — 99499 UNLISTED E&M SERVICE: CPT | Mod: S$GLB,,, | Performed by: NURSE PRACTITIONER

## 2017-05-26 PROCEDURE — 3045F PR MOST RECENT HEMOGLOBIN A1C LEVEL 7.0-9.0%: CPT | Mod: S$GLB,,, | Performed by: INTERNAL MEDICINE

## 2017-05-26 PROCEDURE — 63600175 PHARM REV CODE 636 W HCPCS: Performed by: INTERNAL MEDICINE

## 2017-05-26 PROCEDURE — 3066F NEPHROPATHY DOC TX: CPT | Mod: S$GLB,,, | Performed by: INTERNAL MEDICINE

## 2017-05-26 PROCEDURE — 99213 OFFICE O/P EST LOW 20 MIN: CPT | Mod: S$GLB,,, | Performed by: INTERNAL MEDICINE

## 2017-05-26 PROCEDURE — 1159F MED LIST DOCD IN RCRD: CPT | Mod: S$GLB,,, | Performed by: INTERNAL MEDICINE

## 2017-05-26 PROCEDURE — 96401 CHEMO ANTI-NEOPL SQ/IM: CPT

## 2017-05-26 PROCEDURE — 1126F AMNT PAIN NOTED NONE PRSNT: CPT | Mod: S$GLB,,, | Performed by: INTERNAL MEDICINE

## 2017-05-26 PROCEDURE — 99999 PR PBB SHADOW E&M-EST. PATIENT-LVL III: CPT | Mod: PBBFAC,,, | Performed by: INTERNAL MEDICINE

## 2017-05-26 PROCEDURE — 99999 PR PBB SHADOW E&M-EST. PATIENT-LVL V: CPT | Mod: PBBFAC,,, | Performed by: NURSE PRACTITIONER

## 2017-05-26 RX ORDER — AMOXICILLIN AND CLAVULANATE POTASSIUM 500; 125 MG/1; MG/1
1 TABLET, FILM COATED ORAL 2 TIMES DAILY
Qty: 14 TABLET | Refills: 0 | Status: SHIPPED | OUTPATIENT
Start: 2017-05-26 | End: 2017-08-01 | Stop reason: SDUPTHER

## 2017-05-26 RX ORDER — FUROSEMIDE 40 MG/1
40 TABLET ORAL DAILY
Qty: 30 TABLET | Refills: 11 | Status: SHIPPED | OUTPATIENT
Start: 2017-05-26

## 2017-05-26 RX ADMIN — DENOSUMAB 60 MG: 60 INJECTION SUBCUTANEOUS at 02:05

## 2017-05-26 NOTE — PATIENT INSTRUCTIONS
Counseling and Referral of Other Preventative  (Italic type indicates deductible and co-insurance are waived)    Patient Name: Jesús Olvera  Today's Date: 5/26/2017      SERVICE LIMITATIONS RECOMMENDATION    Vaccines    · Pneumococcal (once after 65)    · Influenza (annually)    · Hepatitis B (if medium/high risk)    · Prevnar 13      Hepatitis B medium/high risk factors:       - End-stage renal disease       - Hemophiliacs who received Factor VII or         IX concentrates       - Clients of institutions for the mentally             retarded       - Persons who live in the same house as          a HepB carrier       - Homosexual men       - Illicit injectable drug abusers     Pneumococcal: N/A-discuss with PCP, review vaccine history     Influenza: Done, repeat in one year     Hepatitis B: N/A     Prevnar 13: N/A, review vaccine history    Prostate cancer screening (annually to age 75)     Prostate specific antigen (PSA) Shared decision making with Provider. Sometimes a co-pay may be required if the patient decides to have this test. The USPSTF no longer recommends prostate cancer screening routinely in medicine: every as instructed by primary care doctor    Colorectal cancer screening (to age 75)    · Fecal occult blood test (annual)  · Flexible sigmoidoscopy (5y)  · Screening colonoscopy (10y)  · Barium enema   Last done 2014, recommend to repeat every 3  years    Diabetes self-management training (no USPSTF recommendations)  Requires referral by treating physician for patient with diabetes or renal disease. 10 hours of initial DSMT sessions of no less than 30 minutes each in a continuous 12-month period. 2 hours of follow-up DSMT in subsequent years.  Done this year, repeat every year    Glaucoma screening (no USPSTF recommendation)  Diabetes mellitus, family history   , age 50 or over    American, age 65 or over  Done this year, repeat every year    Medical nutrition therapy for  diabetes or renal disease (no recommended schedule)  Requires referral by treating physician for patient with diabetes or renal disease or kidney transplant within the past 3 years.  Can be provided in same year as diabetes self-management training (DSMT), and CMS recommends medical nutrition therapy take place after DSMT. Up to 3 hours for initial year and 2 hours in subsequent years.  Done this year, repeat every year    Cardiovascular screening blood tests (every 5 years)  · Fasting lipid panel  Order as a panel if possible  Last done 2005, recommend to repeat every 1  years    Diabetes screening tests (at least every 3 years, Medicare covers annually or at 6-month intervals for prediabetic patients)  · Fasting blood sugar (FBS) or glucose tolerance test (GTT)  Patient must be diagnosed with one of the following:       - Hypertension       - Dyslipidemia       - Obesity (BMI 30kg/m2)       - Previous elevated impaired FBS or GTT       ... or any two of the following:       - Overweight (BMI 25 but <30)       - Family history of diabetes       - Age 65 or older       - History of gestational diabetes or birth of baby weighing more than 9 pounds  Done this year, repeat every year    Abdominal aortic aneurysm screening (once)  · Sonogram   Limited to patients who meet one of the following criteria:       - Men who are 65-75 years old and have smoked more than 100 cigarette in their lifetime       - Anyone with a family history of abdominal aortic aneurysm       - Anyone recommended for screening by the USPSTF  N/A-done outside hospital    HIV screening (annually for increased risk patients)  · HIV-1 and HIV-2 by EIA, or SKINNY, rapid antibody test or oral mucosa transudate  Patients must be at increased risk for HIV infection per USPSTF guidelines or pregnant. Tests covered annually for patient at increased risk or as requested by the patient. Pregnant patients may receive up to 3 tests during pregnancy.  Risks  discussed, screening is not recommended    Smoking cessation counseling (up to 8 sessions per year)  Patients must be asymptomatic of tobacco-related conditions to receive as a preventative service.  Non-smoker    Subsequent annual wellness visit  At least 12 months since last AWV  Return in one year     The following information is provided to all patients.  This information is to help you find resources for any of the problems found today that may be affecting your health:                Living healthy guide: www.[facility].[place].[facility]      Understanding Diabetes: www.diabetes.org      Eating healthy: www.cdc.gov/healthyweight      CDC home safety checklist: www.cdc.gov/steadi/patient.html      Agency on Aging: www.goea.[place].[facility]      Alcoholics anonymous (AA): www.aa.org      Physical Activity: www.braden.nih.gov/ii2qwzc      Tobacco use: www.quitwithusla.org

## 2017-05-29 NOTE — PROGRESS NOTES
"Jesús Olvera presented for a  Medicare AWV and comprehensive Health Risk Assessment today. The following components were reviewed and updated:    · Medical history  · Family History  · Social history  · Allergies and Current Medications  · Health Risk Assessment  · Health Maintenance  · Care Team     ** See Completed Assessments for Annual Wellness Visit within the encounter summary.**       The following assessments were completed:  · Living Situation  · CAGE  · Depression Screening  · Timed Get Up and Go  · Whisper Test  · Cognitive Function Screening  · Nutrition Screening  · ADL Screening  · PAQ Screening    Vitals:    05/26/17 1251 05/26/17 1326   BP: (!) 180/84 (!) 150/82   BP Location: Right arm    Patient Position: Sitting    BP Method: Manual    Pulse: 67    Temp: 98.3 °F (36.8 °C)    TempSrc: Oral    SpO2: 96%    Weight: 95.3 kg (210 lb)    Height: 6' 2" (1.88 m)      Body mass index is 26.96 kg/m².  Physical Exam   Constitutional: He is oriented to person, place, and time. He appears well-developed and well-nourished.   HENT:   Head: Normocephalic and atraumatic.   Nose: Nose normal.   Eyes: Conjunctivae and EOM are normal.   Neck: Normal range of motion. Neck supple.   Cardiovascular: Normal rate, regular rhythm, normal heart sounds and intact distal pulses.    Pulmonary/Chest: Effort normal and breath sounds normal.   Musculoskeletal: Normal range of motion.   Feet:   Left Foot: amputated  Neurological: He is alert and oriented to person, place, and time.   Skin: Skin is warm and dry.   Partial amputation to left foot   Psychiatric: He has a normal mood and affect. His behavior is normal. Judgment and thought content normal.   Nursing note and vitals reviewed.        Diagnoses and health risks identified today and associated recommendations/orders:    *Discussed health maintenance topics. Patient receives care at outside facility including [facility] and [facility]. He and wife state that he is up to date on " recent lab work including lipid and A1C. In addition, they do report having an abdominal ultrasound done. Advised that pneumococcal is do at this time. Patient will discuss this with PCP.    1. Encounter for preventive health examination  Assessment performed. Health maintenance updated. Chart review completed.    2. Type 1 diabetes mellitus with diabetic polyneuropathy  Chronic. Last A1C 7.7. Followed by Endocrinology.    3. Arterial occlusive disease  Stable, chronic. Followed by Cardiology.    4. PVD (peripheral vascular disease)  Stable on medication, chronic. Followed by Cardiology.    5. Chronic diastolic congestive heart failure  Stable on medication, chronic. Followed by Cardiology.    6. Immunosuppressed status  Stable on medication, Followed by Nephrology and Transplant.    7. Status post transmetatarsal amputation of left foot  Chronic, stable. Followed by Podiatry.    8. Secondary hyperparathyroidism of renal origin  Chronic, stable. Followed by Endocrinology.    9. Hypoglycemia associated with diabetes  Chronic, stable. Followed by Endocrinology.    10. Primary osteoarthritis of both knees  Chronic, stable. Followed by Orthopedics and Outpatient Rehab    11. Need for prophylactic immunotherapy for kidney transplant   Stable on medication, Followed by Nephrology and Transplant.    12. -donor kidney transplant 04  Stable. Followed by Nephrology.    13. Age-related osteoporosis with current pathological fracture with routine healing, subsequent encounter  Chronic, stable. Followed by Endocrinology.    14. CKD (chronic kidney disease), stage III  Last creatinine 1.2. Chronic, stable. Followed by Nephrology.      Provided [place] with a 5-10 year written screening schedule and personal prevention plan. Recommendations were developed using the USPSTF age appropriate recommendations. Education, counseling, and referrals were provided as needed. After Visit Summary printed and given to patient which  includes a list of additional screenings\tests needed.      Return for follow up with Primary Care Provider as instructed, ;sooner if problems, HRA in 1 year.    ALEJANDRA Gonzales

## 2017-06-01 NOTE — PROGRESS NOTES
Subjective:       Patient ID: Jesús Olvera is a 72 y.o. Black or  male who presents for follow-up evaluation of Chronic Kidney Disease    HPI      Mr. Olvera is a 72 year old man with past medical history of  donor kidney transplant in  presenting for follow up and management of kidney function and immunosuppressive therapy.  Patient denies any fever, chest pain, shortness of breath, cough, abdominal/flank/allograft pain/tenderness, dysuria/hematuria.  He reports his blood sugars/pressure well-controlled.    Review of Systems   Constitutional: Negative for appetite change, fatigue and fever.   Respiratory: Negative for cough and shortness of breath.    Cardiovascular: Negative for chest pain and leg swelling.   Gastrointestinal: Negative for abdominal pain, constipation, diarrhea, nausea and vomiting.   Genitourinary: Negative for dysuria, flank pain, frequency, hematuria and urgency.   Musculoskeletal: Negative for arthralgias, back pain and joint swelling.   Skin: Negative for rash.   Neurological: Negative for dizziness and light-headedness.   All other systems reviewed and are negative.      Objective:      Physical Exam   Constitutional: He appears well-developed and well-nourished.   Cardiovascular: Normal rate and regular rhythm.  Exam reveals no gallop and no friction rub.    No murmur heard.  Pulmonary/Chest: Effort normal and breath sounds normal. No respiratory distress. He has no wheezes. He has no rales.   Musculoskeletal: He exhibits no edema.   Neurological: He is alert.   Skin: Skin is warm and dry. No rash noted.   Vitals reviewed.      Assessment:       1. Controlled type 2 diabetes mellitus with stage 3 chronic kidney disease, with long-term current use of insulin    2. Chronic kidney disease, stage III (moderate)    3. -donor kidney transplant recipient    4. Hypertensive kidney disease with chronic kidney disease, stage 1-4 or unspecified chronic kidney  disease    5. Hyperparathyroidism due to renal insufficiency        Plan:     Mr. Olvera is a 72 year old man with past medical history of  donor kidney transplant in  presenting for follow up of kidney function and immunosuppressive therapy. Creatinine  within baseline range, will continue to trend. 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      - Immunosuppression: will trend FK level (target 5-7), continue current regimen for now (follow up with Transplant ~2016)  - Hypertension: blood pressure at goal, encouraged patient to continue to monitor  - Anemia: Hgb at goal, no indication for erythropoetin therapy   - Bone/mineral metabolism: patient with secondary hyperparathyroidism, PTH at goal for stage CKD  - Hyperkalemia: likely due to elevated FK level (random level, not true trough), discussed low potassium diet, will repeat labs to trend.  - UTI: will give course of Augmentin given U/A results    Return to clinic 5-6 months with renal/heme panel, FK level, iron/TIBC/ferritin, urinalysis/culture, urine protein/creatinine ratio, PTH prior to next visit

## 2017-06-09 ENCOUNTER — OFFICE VISIT (OUTPATIENT)
Dept: PODIATRY | Facility: CLINIC | Age: 72
End: 2017-06-09
Payer: MEDICARE

## 2017-06-09 VITALS
WEIGHT: 210 LBS | SYSTOLIC BLOOD PRESSURE: 180 MMHG | HEIGHT: 74 IN | DIASTOLIC BLOOD PRESSURE: 72 MMHG | BODY MASS INDEX: 26.95 KG/M2 | HEART RATE: 56 BPM

## 2017-06-09 DIAGNOSIS — Z89.432 S/P TRANSMETATARSAL AMPUTATION OF FOOT, LEFT: ICD-10-CM

## 2017-06-09 DIAGNOSIS — Z87.2 HEALED ULCER OF RIGHT FOOT ON EXAMINATION: ICD-10-CM

## 2017-06-09 DIAGNOSIS — I73.9 PVD (PERIPHERAL VASCULAR DISEASE): ICD-10-CM

## 2017-06-09 DIAGNOSIS — E11.42 DIABETIC POLYNEUROPATHY ASSOCIATED WITH TYPE 2 DIABETES MELLITUS: Primary | ICD-10-CM

## 2017-06-09 PROCEDURE — 99999 PR PBB SHADOW E&M-EST. PATIENT-LVL IV: CPT | Mod: PBBFAC,,, | Performed by: PODIATRIST

## 2017-06-09 PROCEDURE — 3045F PR MOST RECENT HEMOGLOBIN A1C LEVEL 7.0-9.0%: CPT | Mod: S$GLB,,, | Performed by: PODIATRIST

## 2017-06-09 PROCEDURE — 1126F AMNT PAIN NOTED NONE PRSNT: CPT | Mod: S$GLB,,, | Performed by: PODIATRIST

## 2017-06-09 PROCEDURE — 99212 OFFICE O/P EST SF 10 MIN: CPT | Mod: S$GLB,,, | Performed by: PODIATRIST

## 2017-06-09 PROCEDURE — 1159F MED LIST DOCD IN RCRD: CPT | Mod: S$GLB,,, | Performed by: PODIATRIST

## 2017-06-09 PROCEDURE — 3066F NEPHROPATHY DOC TX: CPT | Mod: S$GLB,,, | Performed by: PODIATRIST

## 2017-06-09 PROCEDURE — 99499 UNLISTED E&M SERVICE: CPT | Mod: S$GLB,,, | Performed by: PODIATRIST

## 2017-06-09 NOTE — PATIENT INSTRUCTIONS
Gel toe caps for 2nd toe. See link below from [employer].    [email or web address]-Gel-[email or web address]=sr_1_3_a_[email or web address]&sr=[email or web address]=gel%[email or web address]%[email or web address]=1

## 2017-06-09 NOTE — PROGRESS NOTES
Subjective:      Patient ID: Jesús Olvera is a 72 y.o. male.    Chief Complaint: Diabetes Mellitus (PCP Dr. Joiner 16); Peripheral Neuropathy; and Foot Ulcer    Jesús is a 72 y.o. male who presents to the clinic for evaluation and treatment of high risk feet. Jesús has a past medical history of Anemia associated with chronic renal failure; Arthritis; CAD (coronary atherosclerotic disease); CHF (congestive heart failure); -donor kidney transplant (2004); Diabetes mellitus; Encounter for blood transfusion; GIB (gastrointestinal bleeding) (10/23/2014); Neurogenic bladder; Osteomyelitis of lumbar vertebra (10/20/2014); PVD (peripheral vascular disease); Renal hypertension; Secondary hyperparathyroidism of renal origin; Secondary myopathy; and Thyroid nodule (2016). The patient's chief complaint is diabetic foot ulcer, R foot. This patient has documented high risk feet requiring routine maintenance secondary to diabetes mellitis and those secondary complications of diabetes, as mentioned.  Denies injury. He has been applying iodosorb daily. Denies drainage or swelling at toe. He has been unable to keep crest pad from falling off of toe.    PCP: Dante Joiner MD    Date Last Seen by PCP:   Chief Complaint   Patient presents with    Diabetes Mellitus     PCP Dr. Joiner 16    Peripheral Neuropathy    Foot Ulcer         Current shoe gear:  Affected Foot: Rx diabetic extra depth shoes and custom accommodative insoles     Unaffected Foot: Rx diabetic extra depth shoes and custom accommodative insoles    History of Trauma: negative  Sign of Infection: none    Hemoglobin A1C   Date Value Ref Range Status   06/10/2016 7.7 (H) 4.5 - 6.2 % Final   10/17/2014 7.9 (H) 4.5 - 6.2 % Final   2012 8.7 (H) 4.0 - 6.2 % Final         Review of Systems   Constitution: Negative for chills, fever and night sweats.   Cardiovascular: Positive for leg swelling. Negative for chest pain.    Respiratory: Negative for shortness of breath.    Skin: Positive for poor wound healing.   Musculoskeletal: Negative for joint pain and joint swelling.        L foot partial amputation.    Gastrointestinal: Negative for diarrhea, nausea and vomiting.   Neurological: Positive for numbness and paresthesias.         Objective:      Physical Exam   Constitutional: He is oriented to person, place, and time. He appears well-developed and well-nourished. No distress.   Cardiovascular: Normal rate and intact distal pulses.    Musculoskeletal: Normal range of motion. He exhibits edema (Mild). He exhibits no tenderness.   L transmetatarsal amputation   Flexor contracture at DIP and PIP at lesser toes, R.   Neurological: He is alert and oriented to person, place, and time. He exhibits normal muscle tone.   Neurologic: Sharp/dull sensation absent L foot.   Skin: Skin is warm and dry. No rash noted. He is not diaphoretic. No erythema. No pallor.   Ulcer Location: distal 2nd toe  healed       Psychiatric: He has a normal mood and affect. His behavior is normal. Judgment and thought content normal.   Nursing note and vitals reviewed.          Assessment:       Encounter Diagnoses   Name Primary?    Healed ulcer of right foot on examination     Diabetic polyneuropathy associated with type 2 diabetes mellitus Yes    PVD (peripheral vascular disease)     S/P transmetatarsal amputation of foot, left    This patient has documented high risk feet requiring routine maintenance secondary to diabetes mellitis and those secondary complications of diabetes, as mentioned.        Plan:       Jesús was seen today for diabetes mellitus, peripheral neuropathy and foot ulcer.    Diagnoses and all orders for this visit:    Diabetic polyneuropathy associated with type 2 diabetes mellitus    Healed ulcer of right foot on examination    PVD (peripheral vascular disease)    S/P transmetatarsal amputation of foot, left    - Patient was given  written and verbal instructions regarding foot condition.  I counseled the patient on his conditions, their implications and medical management.    - Gel toe cap for padding  - Crest pad    - Extra depth diabetic shoe    Shoe inspection. Diabetic Foot Education. Patient reminded of the importance of good nutrition and blood sugar control to help prevent podiatric complications of diabetes. Patient instructed on proper foot hygeine. We discussed wearing proper shoe gear, daily foot inspections, never walking without protective shoe gear, never putting sharp instruments to feet      No Follow-up on file.

## 2017-06-26 DIAGNOSIS — N18.30 CHRONIC KIDNEY DISEASE, STAGE III (MODERATE): ICD-10-CM

## 2017-06-26 RX ORDER — HYDRALAZINE HYDROCHLORIDE 10 MG/1
10 TABLET, FILM COATED ORAL EVERY 12 HOURS
Qty: 60 TABLET | Refills: 11 | Status: SHIPPED | OUTPATIENT
Start: 2017-06-26 | End: 2018-03-05 | Stop reason: SDUPTHER

## 2017-06-29 ENCOUNTER — LAB VISIT (OUTPATIENT)
Dept: LAB | Facility: HOSPITAL | Age: 72
End: 2017-06-29
Attending: INTERNAL MEDICINE
Payer: MEDICARE

## 2017-06-29 DIAGNOSIS — Z79.4 CONTROLLED TYPE 2 DIABETES MELLITUS WITH STAGE 3 CHRONIC KIDNEY DISEASE, WITH LONG-TERM CURRENT USE OF INSULIN: ICD-10-CM

## 2017-06-29 DIAGNOSIS — N18.30 CONTROLLED TYPE 2 DIABETES MELLITUS WITH STAGE 3 CHRONIC KIDNEY DISEASE, WITH LONG-TERM CURRENT USE OF INSULIN: ICD-10-CM

## 2017-06-29 DIAGNOSIS — E11.22 CONTROLLED TYPE 2 DIABETES MELLITUS WITH STAGE 3 CHRONIC KIDNEY DISEASE, WITH LONG-TERM CURRENT USE OF INSULIN: ICD-10-CM

## 2017-06-29 LAB
ALBUMIN SERPL BCP-MCNC: 3.4 G/DL
ANION GAP SERPL CALC-SCNC: 8 MMOL/L
BUN SERPL-MCNC: 33 MG/DL
CALCIUM SERPL-MCNC: 10.4 MG/DL
CHLORIDE SERPL-SCNC: 104 MMOL/L
CO2 SERPL-SCNC: 28 MMOL/L
CREAT SERPL-MCNC: 1.4 MG/DL
EST. GFR  (AFRICAN AMERICAN): 58.4 ML/MIN/1.73 M^2
EST. GFR  (NON AFRICAN AMERICAN): 50.5 ML/MIN/1.73 M^2
GLUCOSE SERPL-MCNC: 176 MG/DL
PHOSPHATE SERPL-MCNC: 3.6 MG/DL
POTASSIUM SERPL-SCNC: 5.1 MMOL/L
SODIUM SERPL-SCNC: 140 MMOL/L

## 2017-06-29 PROCEDURE — 80069 RENAL FUNCTION PANEL: CPT

## 2017-06-29 PROCEDURE — 36415 COLL VENOUS BLD VENIPUNCTURE: CPT | Mod: PO

## 2017-07-07 ENCOUNTER — TELEPHONE (OUTPATIENT)
Dept: NEPHROLOGY | Facility: CLINIC | Age: 72
End: 2017-07-07

## 2017-07-07 NOTE — TELEPHONE ENCOUNTER
----- Message from Kirti West sent at 7/7/2017  8:36 AM CDT -----  Contact: self  [phone]  juan  -   Pt calling to speak with the Dr in regards to his blood test    Pt wants to discuss it with the Dr   Thanks,    Call pt regarding the message he left , I let pt know that I routed the message over to dr martinez and to give him time to look over the results either his nurse, hiself or me will give him a called back with the result.

## 2017-07-10 RX ORDER — CIPROFLOXACIN 500 MG/1
500 TABLET ORAL 2 TIMES DAILY
Qty: 20 TABLET | Refills: 0 | Status: SHIPPED | OUTPATIENT
Start: 2017-07-10 | End: 2017-07-20

## 2017-07-28 ENCOUNTER — LAB VISIT (OUTPATIENT)
Dept: LAB | Facility: HOSPITAL | Age: 72
End: 2017-07-28
Attending: INTERNAL MEDICINE
Payer: MEDICARE

## 2017-07-28 DIAGNOSIS — N39.0 UTI (URINARY TRACT INFECTION), UNCOMPLICATED: Primary | ICD-10-CM

## 2017-07-28 DIAGNOSIS — N39.0 UTI (URINARY TRACT INFECTION), UNCOMPLICATED: ICD-10-CM

## 2017-07-28 LAB
BACTERIA #/AREA URNS AUTO: ABNORMAL /HPF
BILIRUB UR QL STRIP: NEGATIVE
CLARITY UR REFRACT.AUTO: ABNORMAL
COLOR UR AUTO: YELLOW
GLUCOSE UR QL STRIP: NEGATIVE
HGB UR QL STRIP: NEGATIVE
HYALINE CASTS UR QL AUTO: 0 /LPF
KETONES UR QL STRIP: NEGATIVE
LEUKOCYTE ESTERASE UR QL STRIP: ABNORMAL
MICROSCOPIC COMMENT: ABNORMAL
NITRITE UR QL STRIP: NEGATIVE
PH UR STRIP: 6 [PH] (ref 5–8)
PROT UR QL STRIP: ABNORMAL
RBC #/AREA URNS AUTO: 2 /HPF (ref 0–4)
SP GR UR STRIP: 1.01 (ref 1–1.03)
SQUAMOUS #/AREA URNS AUTO: 1 /HPF
URN SPEC COLLECT METH UR: ABNORMAL
UROBILINOGEN UR STRIP-ACNC: NEGATIVE EU/DL
WBC #/AREA URNS AUTO: 35 /HPF (ref 0–5)
WBC CLUMPS UR QL AUTO: ABNORMAL

## 2017-07-28 PROCEDURE — 87186 SC STD MICRODIL/AGAR DIL: CPT

## 2017-07-28 PROCEDURE — 81001 URINALYSIS AUTO W/SCOPE: CPT

## 2017-07-28 PROCEDURE — 87077 CULTURE AEROBIC IDENTIFY: CPT

## 2017-07-28 PROCEDURE — 87088 URINE BACTERIA CULTURE: CPT

## 2017-07-28 PROCEDURE — 87086 URINE CULTURE/COLONY COUNT: CPT

## 2017-07-31 LAB — BACTERIA UR CULT: NORMAL

## 2017-08-01 ENCOUNTER — TELEPHONE (OUTPATIENT)
Dept: NEPHROLOGY | Facility: CLINIC | Age: 72
End: 2017-08-01

## 2017-08-01 RX ORDER — AMOXICILLIN AND CLAVULANATE POTASSIUM 500; 125 MG/1; MG/1
1 TABLET, FILM COATED ORAL 2 TIMES DAILY
Qty: 28 TABLET | Refills: 1 | Status: SHIPPED | OUTPATIENT
Start: 2017-08-01 | End: 2017-08-15

## 2017-08-10 ENCOUNTER — LAB VISIT (OUTPATIENT)
Dept: LAB | Facility: HOSPITAL | Age: 72
End: 2017-08-10
Attending: INTERNAL MEDICINE
Payer: MEDICARE

## 2017-08-10 DIAGNOSIS — E21.3 HYPERPARATHYROIDISM: ICD-10-CM

## 2017-08-10 LAB
25(OH)D3+25(OH)D2 SERPL-MCNC: 38 NG/ML
ALBUMIN SERPL BCP-MCNC: 3.3 G/DL
ANION GAP SERPL CALC-SCNC: 10 MMOL/L
BUN SERPL-MCNC: 38 MG/DL
CALCIUM SERPL-MCNC: 9.7 MG/DL
CHLORIDE SERPL-SCNC: 105 MMOL/L
CO2 SERPL-SCNC: 23 MMOL/L
CREAT SERPL-MCNC: 1.5 MG/DL
EST. GFR  (AFRICAN AMERICAN): 53.8 ML/MIN/1.73 M^2
EST. GFR  (NON AFRICAN AMERICAN): 46.5 ML/MIN/1.73 M^2
GLUCOSE SERPL-MCNC: 317 MG/DL
PHOSPHATE SERPL-MCNC: 3.4 MG/DL
POTASSIUM SERPL-SCNC: 4.9 MMOL/L
PTH-INTACT SERPL-MCNC: 72 PG/ML
SODIUM SERPL-SCNC: 138 MMOL/L

## 2017-08-10 PROCEDURE — 80069 RENAL FUNCTION PANEL: CPT

## 2017-08-10 PROCEDURE — 82306 VITAMIN D 25 HYDROXY: CPT

## 2017-08-10 PROCEDURE — 83970 ASSAY OF PARATHORMONE: CPT

## 2017-08-10 PROCEDURE — 36415 COLL VENOUS BLD VENIPUNCTURE: CPT | Mod: PO

## 2017-08-11 ENCOUNTER — TELEPHONE (OUTPATIENT)
Dept: ENDOCRINOLOGY | Facility: CLINIC | Age: 72
End: 2017-08-11

## 2017-08-11 DIAGNOSIS — E21.3 HYPERPARATHYROIDISM: Primary | ICD-10-CM

## 2017-08-11 NOTE — TELEPHONE ENCOUNTER
Ref. Range 8/10/2017 08:47   Vit D, 25-Hydroxy Latest Ref Range: 30 - 96 ng/mL 38   PTH Latest Ref Range: 9.0 - 77.0 pg/mL 72.0   Sodium Latest Ref Range: 136 - 145 mmol/L 138   Potassium Latest Ref Range: 3.5 - 5.1 mmol/L 4.9   Chloride Latest Ref Range: 95 - 110 mmol/L 105   CO2 Latest Ref Range: 23 - 29 mmol/L 23   Anion Gap Latest Ref Range: 8 - 16 mmol/L 10   BUN, Bld Latest Ref Range: 8 - 23 mg/dL 38 (H)   Creatinine Latest Ref Range: 0.5 - 1.4 mg/dL 1.5 (H)   eGFR if non African American Latest Ref Range: >60 mL/min/1.73 m^2 46.5 (A)   eGFR if African American Latest Ref Range: >60 mL/min/1.73 m^2 53.8 (A)   Glucose Latest Ref Range: 70 - 110 mg/dL 317 (H)   Calcium Latest Ref Range: 8.7 - 10.5 mg/dL 9.7   Phosphorus Latest Ref Range: 2.7 - 4.5 mg/dL 3.4   Albumin Latest Ref Range: 3.5 - 5.2 g/dL 3.3 (L)         Initial differential was normocalcemic HPT vs 2HPT due to vit D def (in setting of ckd3- hx RTx 2004)    PTH normalized since repletion of vitamin D. Calcium and phos normal. He received Prolia in 5/2017 and next dose planned in 11/2017. Pt without complaints and no new fractures. Plan to repeat labs (pth/vitD/renal panel) in 6 months to ensure stability

## 2017-08-14 ENCOUNTER — TELEPHONE (OUTPATIENT)
Dept: ENDOCRINOLOGY | Facility: CLINIC | Age: 72
End: 2017-08-14

## 2017-08-14 DIAGNOSIS — E21.3 HYPERPARATHYROIDISM: Primary | ICD-10-CM

## 2017-08-21 DIAGNOSIS — Z94.0 KIDNEY REPLACED BY TRANSPLANT: ICD-10-CM

## 2017-08-30 ENCOUNTER — PATIENT MESSAGE (OUTPATIENT)
Dept: NEUROSURGERY | Facility: CLINIC | Age: 72
End: 2017-08-30

## 2017-09-13 RX ORDER — NEPHROCAP 1 MG
1 CAP ORAL DAILY
Qty: 90 CAPSULE | Refills: 3 | Status: SHIPPED | OUTPATIENT
Start: 2017-09-13 | End: 2018-01-29 | Stop reason: SDUPTHER

## 2017-11-02 ENCOUNTER — OFFICE VISIT (OUTPATIENT)
Dept: NEUROSURGERY | Facility: CLINIC | Age: 72
End: 2017-11-02
Payer: MEDICARE

## 2017-11-02 ENCOUNTER — HOSPITAL ENCOUNTER (OUTPATIENT)
Dept: RADIOLOGY | Facility: HOSPITAL | Age: 72
Discharge: HOME | End: 2017-11-02
Attending: NEUROLOGICAL SURGERY
Payer: MEDICARE

## 2017-11-02 VITALS
WEIGHT: 210 LBS | HEIGHT: 74 IN | HEART RATE: 58 BPM | DIASTOLIC BLOOD PRESSURE: 73 MMHG | SYSTOLIC BLOOD PRESSURE: 178 MMHG | BODY MASS INDEX: 26.95 KG/M2 | TEMPERATURE: 98 F

## 2017-11-02 DIAGNOSIS — M41.50 DEGENERATIVE SCOLIOSIS IN ADULT PATIENT: ICD-10-CM

## 2017-11-02 DIAGNOSIS — S32.009K PSEUDOARTHROSIS OF LUMBAR SPINE: Primary | ICD-10-CM

## 2017-11-02 PROCEDURE — 99499 UNLISTED E&M SERVICE: CPT | Mod: S$GLB,,, | Performed by: NEUROLOGICAL SURGERY

## 2017-11-02 PROCEDURE — 72082 X-RAY EXAM ENTIRE SPI 2/3 VW: CPT | Mod: TC

## 2017-11-02 PROCEDURE — 72082 X-RAY EXAM ENTIRE SPI 2/3 VW: CPT | Mod: 26,,, | Performed by: RADIOLOGY

## 2017-11-02 PROCEDURE — 99999 PR PBB SHADOW E&M-EST. PATIENT-LVL III: CPT | Mod: PBBFAC,,, | Performed by: NEUROLOGICAL SURGERY

## 2017-11-02 PROCEDURE — 99214 OFFICE O/P EST MOD 30 MIN: CPT | Mod: S$GLB,,, | Performed by: NEUROLOGICAL SURGERY

## 2017-11-02 NOTE — PROGRESS NOTES
CHIEF COMPLAINT:  Follow Up     I, Deejay Montes, attest that this documentation has been prepared under the direction and in the presence of William Maxwell MD.    HPI:  Jesús Olvera is a 72 y.o.  male with a history of lumbar osteomyelitis, peripheral vascular disease, CAD, CHF, kidney transplant, diabetes mellitus, and on Aspirin, who presents today for a 6 month follow up evaluation with new imaging. Pt reports minor persisting low back and RLE pain which is much improved compared to 2016. He notes slowly worsening weakness in his BLE. Pt is no longer taking antibiotics. He denies b/b dysfunction. Pt is also receiving Forteo injections and taking vitamin D. Pt has participated in physical therapy with some improvement. Pt's wife states that he rides his stationary bike for exercise. Pt presents today in a wheelchair, though he uses a walker at home.     Review of patient's allergies indicates:  No Known Allergies    Past Medical History:   Diagnosis Date    Anemia associated with chronic renal failure     Arthritis     CAD (coronary atherosclerotic disease)     CHF (congestive heart failure)     -donor kidney transplant 2004    Diabetes mellitus     Encounter for blood transfusion     GIB (gastrointestinal bleeding) 10/23/2014    Neurogenic bladder     Osteomyelitis of lumbar vertebra 10/20/2014    PVD (peripheral vascular disease)     Heel ulcers with osteomyelitis, subsequent hemipedectomy on the left      Renal hypertension     Secondary hyperparathyroidism of renal origin     Secondary myopathy     steriod induced    Thyroid nodule 2016     Past Surgical History:   Procedure Laterality Date    BACK SURGERY      EYE SURGERY      FOOT SURGERY Left     partial amputation    hemipedectomy      left foot    KIDNEY TRANSPLANT  04    VASCULAR SURGERY       Family History   Problem Relation Age of Onset    Alzheimer's disease Mother     Diabetes Mother     Heart  disease Father     Diabetes Sister      x1    Diabetes Brother      x1    No Known Problems Daughter      Social History   Substance Use Topics    Smoking status: Former Smoker     Quit date: 3/14/1974    Smokeless tobacco: Former User    Alcohol use No      Comment: stopped 25yrs ago        Review of Systems   Constitutional: Negative.    HENT: Negative.    Eyes: Negative.    Respiratory: Negative.    Cardiovascular: Negative.    Gastrointestinal: Negative.    Endocrine: Negative.    Genitourinary: Negative.    Musculoskeletal: Positive for back pain (low back pain) and myalgias (RLE pain). Negative for gait problem and neck pain.   Skin: Negative.    Allergic/Immunologic: Negative.    Neurological: Negative for weakness, light-headedness, numbness and headaches.   Hematological: Negative.    Psychiatric/Behavioral: Negative.        OBJECTIVE:   Vital Signs:  Temp: 98.1 °F (36.7 °C) (11/02/17 1329)  Pulse: (!) 58 (11/02/17 1329)  BP: (!) 178/73 (11/02/17 1329)    Physical Exam:    Vital signs: All nursing notes and vital signs reviewed -- afebrile, vital signs stable.  Constitutional: Patient sitting comfortably in chair. Appears well developed and well nourished.  Skin: Exposed areas are intact without abnormal markings, rashes or other lesions. Well healed scar lumbosacral region.  HEENT: Normocephalic. Normal conjunctivae.  Cardiovascular: Normal rate and regular rhythm.  Respiratory: Chest wall rises and falls symmetrically, without signs of respiratory distress.  Abdomen: Soft and non-tender.  Extremities: Warm and without edema. Calves supple, non-tender.  Psych/Behavior: Normal affect.    Generalized muscle atrophy in the right leg.    Neurological:    Mental status: Alert and oriented. Conversational and appropriate.       Cranial Nerves: Grossly intact.     Motor:    Upper:  Deltoids Triceps Biceps WE WF     R 5/5 5/5 5/5 5/5 5/5 5/5    L 5/5 5/5 5/5 5/5 5/5 5/5      Lower:  HF KE KF DF PF EHL     R 4-/5 4+/5 5/5 0/5 0/5 2/5    L 4+/5 5/5 5/5 2/5 2/5 5/5     Partial amputation left foot.     Sensory: Intact sensation to light touch in all extremities. Romberg negative.    Reflexes:          DTR: 2+ symmetrically throughout.     Araujo's: Negative.     Babinski's: Negative.     Clonus: Negative.    Cerebellar: Finger-to-nose and rapid alternating movements normal. Gait stable, fluid.    Spine:    Posture: Head well aligned over pelvis in front and side views.  No focal or global spinal deformity visible on inspection. Shoulders and hips even. No obvious leg length discrepancy. No scapula winging.    Bending: Full ROM with forward, back and lateral bending. No rib prominence with forward bend.    Cervical:      ROM: Full with flexion, extension, lateral rotation and ear-to-shoulder bend.      Midline TTP: Negative.     Spurling's test: Negative.     Lhermitte's: Negative.    Thoracic:     Midline TTP: Negative    Lumbar:     Midline TTP: Negative     Straight Leg Test: Negative     Crossed Straight Leg Test: Negative     Sciatic notch tenderness: Negative.    Other:     SI joint TTP: Negative.     Greater trochanter TTP: Negative.     Tenderness with external/internal hip rotation: Negative.    Diagnostic Results:  All imaging was independently reviewed by me.    Scoliosis standing AP and Lateral X-ray, dated 11/2/2017:  1. No significant imbalance    ASSESSMENT/PLAN:     Jesús Olvera is s/p L2 -S1 fusion by Dr Saha in 2014 for osteomyelitis. Pt has pseudoarthrosis of all levels that remains stable. No significant imbalance on Scoliosis X-rays. Pt continues to improve with conservative management and surgical intervention is not necessary at this time. Pt will follow up for reevaluation in 1 year with a new Scoliosis X-ray    The patient understands and agrees with the plan of care. All questions were answered.     1. Scoliosis X-ray  2. RTC 1 Year    I, Dr. William Maxwell personally performed the services  described in this documentation. All medical record entries made by the scribe, Deejay Mnotes, were at my direction and in my presence.  I have reviewed the chart and agree that the record reflects my personal performance and is accurate and complete.        William Maxwell M.D.  Department of Neurosurgery  [facility]      .

## 2017-11-02 NOTE — LETTER
November 7, 2017      Dante Joiner MD  [address]  [address]  [facility] [address]           [facility] - Neurosurgery 7th Fl  [address]  [facility] [address]  Phone: [phone]          Patient: Jesús Olvera   MR Number: [id number]   YOB: 1945   Date of Visit: 11/2/2017       Dear Dr. Dante Joiner:    Thank you for referring Jesús Olvera to me for evaluation. Attached you will find relevant portions of my assessment and plan of care.    If you have questions, please do not hesitate to call me. I look forward to following Jesús Olvera along with you.    Sincerely,    William Maxwell MD    Enclosure  CC:  No Recipients    If you would like to receive this communication electronically, please contact [email or web address] or [phone] to request more information on [employer] Link access.    For providers and/or their staff who would like to refer a patient to [facility], please contact us through our one-stop-shop provider referral line, [facility], at [phone].    If you feel you have received this communication in error or would no longer like to receive these types of communications, please e-mail [email or web address]

## 2017-11-14 ENCOUNTER — PATIENT MESSAGE (OUTPATIENT)
Dept: NEPHROLOGY | Facility: CLINIC | Age: 72
End: 2017-11-14

## 2017-11-15 RX ORDER — GABAPENTIN 300 MG/1
300 CAPSULE ORAL 3 TIMES DAILY
Qty: 90 CAPSULE | Refills: 0 | Status: SHIPPED | OUTPATIENT
Start: 2017-11-15 | End: 2017-12-28 | Stop reason: SDUPTHER

## 2017-11-27 ENCOUNTER — INFUSION (OUTPATIENT)
Dept: INFECTIOUS DISEASES | Facility: HOSPITAL | Age: 72
End: 2017-11-27
Attending: INTERNAL MEDICINE
Payer: MEDICARE

## 2017-11-27 ENCOUNTER — OFFICE VISIT (OUTPATIENT)
Dept: PODIATRY | Facility: CLINIC | Age: 72
End: 2017-11-27
Payer: MEDICARE

## 2017-11-27 VITALS
HEIGHT: 74 IN | BODY MASS INDEX: 26.96 KG/M2 | HEIGHT: 74 IN | DIASTOLIC BLOOD PRESSURE: 66 MMHG | BODY MASS INDEX: 26.95 KG/M2 | SYSTOLIC BLOOD PRESSURE: 141 MMHG | WEIGHT: 210 LBS | HEART RATE: 55 BPM

## 2017-11-27 DIAGNOSIS — L84 TYPE 2 DIABETES MELLITUS WITH PRESSURE CALLUS: ICD-10-CM

## 2017-11-27 DIAGNOSIS — Z89.432 S/P TRANSMETATARSAL AMPUTATION OF FOOT, LEFT: ICD-10-CM

## 2017-11-27 DIAGNOSIS — E11.42 DIABETIC POLYNEUROPATHY ASSOCIATED WITH TYPE 2 DIABETES MELLITUS: Primary | ICD-10-CM

## 2017-11-27 DIAGNOSIS — M80.00XD AGE-RELATED OSTEOPOROSIS WITH CURRENT PATHOLOGICAL FRACTURE WITH ROUTINE HEALING, SUBSEQUENT ENCOUNTER: Primary | ICD-10-CM

## 2017-11-27 DIAGNOSIS — I73.9 PVD (PERIPHERAL VASCULAR DISEASE): ICD-10-CM

## 2017-11-27 DIAGNOSIS — E11.628 TYPE 2 DIABETES MELLITUS WITH PRESSURE CALLUS: ICD-10-CM

## 2017-11-27 DIAGNOSIS — B35.1 ONYCHOMYCOSIS DUE TO DERMATOPHYTE: ICD-10-CM

## 2017-11-27 PROCEDURE — 99499 UNLISTED E&M SERVICE: CPT | Mod: S$GLB,,, | Performed by: PODIATRIST

## 2017-11-27 PROCEDURE — 99999 PR PBB SHADOW E&M-EST. PATIENT-LVL IV: CPT | Mod: PBBFAC,,, | Performed by: PODIATRIST

## 2017-11-27 PROCEDURE — 11055 PARING/CUTG B9 HYPRKER LES 1: CPT | Mod: Q9,S$GLB,, | Performed by: PODIATRIST

## 2017-11-27 PROCEDURE — 96372 THER/PROPH/DIAG INJ SC/IM: CPT

## 2017-11-27 PROCEDURE — 63600175 PHARM REV CODE 636 W HCPCS: Performed by: INTERNAL MEDICINE

## 2017-11-27 PROCEDURE — 11720 DEBRIDE NAIL 1-5: CPT | Mod: 59,Q9,S$GLB, | Performed by: PODIATRIST

## 2017-11-27 RX ADMIN — DENOSUMAB 60 MG: 60 INJECTION SUBCUTANEOUS at 08:11

## 2017-11-28 ENCOUNTER — PATIENT MESSAGE (OUTPATIENT)
Dept: NEPHROLOGY | Facility: CLINIC | Age: 72
End: 2017-11-28

## 2017-11-28 DIAGNOSIS — Z94.0 DECEASED-DONOR KIDNEY TRANSPLANT RECIPIENT: ICD-10-CM

## 2017-11-28 RX ORDER — TACROLIMUS 1 MG/1
2 CAPSULE ORAL EVERY 12 HOURS
Qty: 360 CAPSULE | Refills: 3 | Status: SHIPPED | OUTPATIENT
Start: 2017-11-28

## 2017-12-04 DIAGNOSIS — E11.9 DIABETES MELLITUS WITHOUT COMPLICATION: Primary | ICD-10-CM

## 2017-12-05 ENCOUNTER — OFFICE VISIT (OUTPATIENT)
Dept: INTERNAL MEDICINE | Facility: CLINIC | Age: 72
End: 2017-12-05
Attending: FAMILY MEDICINE
Payer: MEDICARE

## 2017-12-05 VITALS
BODY MASS INDEX: 27.25 KG/M2 | HEIGHT: 74 IN | SYSTOLIC BLOOD PRESSURE: 157 MMHG | HEART RATE: 60 BPM | WEIGHT: 212.31 LBS | DIASTOLIC BLOOD PRESSURE: 64 MMHG

## 2017-12-05 DIAGNOSIS — I73.9 PVD (PERIPHERAL VASCULAR DISEASE): ICD-10-CM

## 2017-12-05 DIAGNOSIS — E78.5 HYPERLIPIDEMIA, UNSPECIFIED HYPERLIPIDEMIA TYPE: ICD-10-CM

## 2017-12-05 DIAGNOSIS — Z00.00 ENCOUNTER FOR PREVENTIVE HEALTH EXAMINATION: Primary | ICD-10-CM

## 2017-12-05 DIAGNOSIS — E10.42 TYPE 1 DIABETES MELLITUS WITH DIABETIC POLYNEUROPATHY: ICD-10-CM

## 2017-12-05 DIAGNOSIS — F17.200 HEAVY SMOKER: ICD-10-CM

## 2017-12-05 DIAGNOSIS — E11.9 DIABETES MELLITUS WITHOUT COMPLICATION: ICD-10-CM

## 2017-12-05 PROCEDURE — 99397 PER PM REEVAL EST PAT 65+ YR: CPT | Mod: S$GLB,,, | Performed by: FAMILY MEDICINE

## 2017-12-05 PROCEDURE — 99999 PR PBB SHADOW E&M-EST. PATIENT-LVL III: CPT | Mod: PBBFAC,,, | Performed by: FAMILY MEDICINE

## 2017-12-05 PROCEDURE — 99499 UNLISTED E&M SERVICE: CPT | Mod: S$GLB,,, | Performed by: FAMILY MEDICINE

## 2017-12-05 RX ORDER — PROMETHAZINE HYDROCHLORIDE AND CODEINE PHOSPHATE 6.25; 1 MG/5ML; MG/5ML
5 SOLUTION ORAL EVERY 4 HOURS PRN
Qty: 180 ML | Refills: 0 | Status: SHIPPED | OUTPATIENT
Start: 2017-12-05 | End: 2017-12-15

## 2017-12-05 RX ORDER — AMOXICILLIN AND CLAVULANATE POTASSIUM 875; 125 MG/1; MG/1
1 TABLET, FILM COATED ORAL 2 TIMES DAILY
Qty: 20 TABLET | Refills: 0 | Status: SHIPPED | OUTPATIENT
Start: 2017-12-05 | End: 2017-12-15

## 2017-12-05 NOTE — PROGRESS NOTES
CHIEF COMPLAINT: Annual exam    HISTORY OF PRESENT ILLNESS: The patient is a chronically ill 72-year-old type I diabetic.  He has been diabetic on insulin for about 40 years.  He underwent a kidney transplant in 2004.  He has had many surgeries over the years.  He has had a spinal fusion done about 2 years ago for chronic back pain.  He was admitted over the summer of 2016 for an acute kidney injury which resolved.  Neurosurgery is considering a long segment fusion from T10-S1.  This is because of recurrent lumbar pain and loose hardware.    Only acute problem at this time is a 10 day history of productive cough.  No fevers or chills are noted.    Up until recently he was cared for by an endocrinologist who is also his PCP at another hospital.  The endocrinologist is now planning to care only for the patient's diabetes.  I note that blood work is up-to-date.  A1c in the last 3 months was 7.2.    The patient has a history of diabetes.  Recent blood sugars have been less than 120.  There have been no episodes of hypoglycemia.    The patient has a history of stable hypertension on current medications.  Patient denies chest pain or shortness of breath today.    As mentioned above he underwent a kidney transplant in 2004.  His creatinine most recently was 1.2.    REVIEW OF SYSTEMS:  GENERAL: No fever, chills, fatigability or weight loss.  SKIN: No rashes, itching or changes in color or texture of skin.  HEAD: No headaches or recent head trauma.  EYES: Visual acuity fine. No photophobia, ocular pain or diplopia.  EARS: Denies ear pain, discharge or vertigo.  NOSE: No loss of smell, no epistaxis or postnasal drip.  MOUTH & THROAT: No hoarseness or change in voice. No excessive gum bleeding.  NODES: Denies swollen glands.  CHEST: Denies SWAIN, cyanosis, wheezing.  CARDIOVASCULAR: Denies chest pain, PND, orthopnea or reduced exercise tolerance.  ABDOMEN: Appetite fine. No weight loss. Denies diarrhea, abdominal pain,  "hematemesis or blood in stool.  URINARY: No flank pain, dysuria or hematuria.  PERIPHERAL VASCULAR: No claudication or cyanosis.  MUSCULOSKELETAL: No joint stiffness or swelling.  He has fairly severe chronic back pain   NEUROLOGIC: No history of seizures, paralysis, alteration of gait or coordination.    SOCIAL HISTORY: The patient does not smoke.  The patient consumes alcohol socially.  The patient is happily .    PHYSICAL EXAMINATION:   BP (!) 157/64   Pulse 60   Ht 6' 2" (1.88 m)   Wt 96.3 kg (212 lb 4.9 oz)   BMI 27.26 kg/m²     APPEARANCE: Well nourished, well developed, in no acute distress.    HEAD: Normocephalic, atraumatic.  EYES: PERRL. EOMI.  Conjunctivae without injection and  anicteric  EARS: TM's intact. Light reflex normal. No retraction or perforation.    NOSE: Mucosa pink. Airway clear.  MOUTH & THROAT: No tonsillar enlargement. No pharyngeal erythema or exudate. No stridor.  NECK: Supple.   NODES: No cervical, axillary or inguinal lymph node enlargement.  CHEST: Lungs clear to auscultation.  No retractions are noted.  No rales or rhonchi are present.  CARDIOVASCULAR: Normal S1, S2. No rubs, murmurs or gallops.  ABDOMEN: Bowel sounds normal. Not distended. Soft. No tenderness or masses.  No ascites is noted.  MUSCULOSKELETAL:  There is no clubbing, cyanosis, or edema of the extremities x4.  There is full range of motion of the lumbar spine.  There is full range of motion of the extremities x4.  There is no deformity noted.    NEUROLOGIC:       Normal speech development.      Hearing normal.  He is in a back brace.  He is also in a wheelchair.      PSYCHIATRIC: Patient is alert and oriented x3.  Thought processes are all normal.  There is no homicidality.  There is no suicidality.  There is no evidence of psychosis.    LABORATORY/RADIOLOGY:   Chart reviewedincluding renal transplant notes.  Neurosurgery notes also reviewed.  It looks like we're planning a T10 to S1 long segment fusion in " the next 4-8 weeks.  In terms of his health maintenance and blood work He is up-to-date today.    ASSESSMENT:   Annual exam  Acute bronchitis  Chronic back pain with loose hardware   Type 1 diabetes well controlled  Hypertension, not well controlled today but off of hydralazine due to a pharmacy question  Peripheral vascular disease  Coronary artery disease    PLAN:  We had a nice visit today.  His blood work and other health maintenance issues are up-to-date.  He also has undergone many surgeries without any anesthetic complication in the past.  Coordination with the renal transplant team will obviously be necessary to comanage his overall health while inpatient for any fusion in the future.    Hydralazine   Augmentin and Phenergan with codeine  Call with failure to improve  Return to clinic in 6 months  Answers for HPI/ROS submitted by the patient on 12/4/2017   Cough  Chronicity: new  Onset: in the past 7 days  Progression since onset: gradually improving  Frequency: every few hours  Cough characteristics: productive of sputum  chest pain: No  chills: No  ear congestion: No  ear pain: No  fever: No  headaches: No  heartburn: No  hemoptysis: No  myalgias: No  nasal congestion: Yes  postnasal drip: Yes  rash: No  rhinorrhea: Yes  shortness of breath: No  sore throat: No  sweats: No  weight loss: No  wheezing: No  Aggravated by: nothing  asthma: No  bronchiectasis: No  bronchitis: No  COPD: No  emphysema: No  environmental allergies: No  pneumonia: No  Treatments tried: OTC cough suppressant  Improvement on treatment: mild

## 2017-12-11 ENCOUNTER — TELEPHONE (OUTPATIENT)
Dept: INTERNAL MEDICINE | Facility: CLINIC | Age: 72
End: 2017-12-11

## 2017-12-11 NOTE — TELEPHONE ENCOUNTER
Faxed over release of information to Dr Harper of [facility]. To obtain eye exam records. Fax sent successfully.

## 2017-12-12 ENCOUNTER — TELEPHONE (OUTPATIENT)
Dept: INTERNAL MEDICINE | Facility: CLINIC | Age: 72
End: 2017-12-12

## 2017-12-13 DIAGNOSIS — N18.30 CHRONIC KIDNEY DISEASE, STAGE III (MODERATE): ICD-10-CM

## 2017-12-13 RX ORDER — SODIUM BICARBONATE 650 MG/1
TABLET ORAL
Qty: 90 TABLET | Refills: 3 | Status: SHIPPED | OUTPATIENT
Start: 2017-12-13

## 2017-12-28 RX ORDER — GABAPENTIN 300 MG/1
300 CAPSULE ORAL 3 TIMES DAILY
Qty: 90 CAPSULE | Refills: 0 | Status: SHIPPED | OUTPATIENT
Start: 2017-12-28 | End: 2018-01-25 | Stop reason: SDUPTHER

## 2018-01-09 DIAGNOSIS — N18.30 CHRONIC KIDNEY DISEASE, STAGE III (MODERATE): Primary | ICD-10-CM

## 2018-01-19 ENCOUNTER — OFFICE VISIT (OUTPATIENT)
Dept: NEPHROLOGY | Facility: CLINIC | Age: 73
End: 2018-01-19
Payer: MEDICARE

## 2018-01-19 VITALS
HEIGHT: 74 IN | DIASTOLIC BLOOD PRESSURE: 62 MMHG | WEIGHT: 215 LBS | SYSTOLIC BLOOD PRESSURE: 100 MMHG | BODY MASS INDEX: 27.59 KG/M2 | HEART RATE: 60 BPM | OXYGEN SATURATION: 95 %

## 2018-01-19 DIAGNOSIS — Z79.4 CONTROLLED TYPE 2 DIABETES MELLITUS WITH STAGE 3 CHRONIC KIDNEY DISEASE, WITH LONG-TERM CURRENT USE OF INSULIN: Primary | ICD-10-CM

## 2018-01-19 DIAGNOSIS — Z94.0 DECEASED-DONOR KIDNEY TRANSPLANT RECIPIENT: ICD-10-CM

## 2018-01-19 DIAGNOSIS — I12.9 HYPERTENSIVE KIDNEY DISEASE WITH CHRONIC KIDNEY DISEASE, STAGE 1-4 OR UNSPECIFIED CHRONIC KIDNEY DISEASE: ICD-10-CM

## 2018-01-19 DIAGNOSIS — E11.22 CONTROLLED TYPE 2 DIABETES MELLITUS WITH STAGE 3 CHRONIC KIDNEY DISEASE, WITH LONG-TERM CURRENT USE OF INSULIN: Primary | ICD-10-CM

## 2018-01-19 DIAGNOSIS — N25.81 HYPERPARATHYROIDISM DUE TO RENAL INSUFFICIENCY: ICD-10-CM

## 2018-01-19 DIAGNOSIS — N18.30 CONTROLLED TYPE 2 DIABETES MELLITUS WITH STAGE 3 CHRONIC KIDNEY DISEASE, WITH LONG-TERM CURRENT USE OF INSULIN: Primary | ICD-10-CM

## 2018-01-19 PROCEDURE — 99499 UNLISTED E&M SERVICE: CPT | Mod: S$GLB,,, | Performed by: INTERNAL MEDICINE

## 2018-01-19 PROCEDURE — 99999 PR PBB SHADOW E&M-EST. PATIENT-LVL III: CPT | Mod: PBBFAC,,, | Performed by: INTERNAL MEDICINE

## 2018-01-19 PROCEDURE — 99214 OFFICE O/P EST MOD 30 MIN: CPT | Mod: S$GLB,,, | Performed by: INTERNAL MEDICINE

## 2018-01-19 RX ORDER — INSULIN GLARGINE 100 [IU]/ML
INJECTION, SOLUTION SUBCUTANEOUS
COMMUNITY
Start: 2018-01-16

## 2018-01-19 NOTE — PROGRESS NOTES
Subjective:       Patient ID: Jesús Olvera is a 72 y.o. Black or  male who presents for follow-up evaluation of Chronic Kidney Disease    HPI      Mr. Olvera is a 72 year old man with past medical history of  donor kidney transplant in  presenting for follow up and management of kidney function and immunosuppressive therapy.  Patient reports intermittent cough, otherwise  denies any fever, chest pain, shortness of breath, abdominal/flank/allograft pain/tenderness, dysuria/hematuria.  He reports his blood sugars/pressure well-controlled.    Review of Systems   Constitutional: Negative for appetite change, fatigue and fever.   Respiratory: Negative for cough and shortness of breath.    Cardiovascular: Negative for chest pain and leg swelling.   Gastrointestinal: Negative for abdominal pain, constipation, diarrhea, nausea and vomiting.   Genitourinary: Negative for dysuria, flank pain, frequency, hematuria and urgency.   Musculoskeletal: Negative for arthralgias, back pain and joint swelling.   Skin: Negative for rash.   Neurological: Negative for dizziness and light-headedness.   All other systems reviewed and are negative.      Objective:      Physical Exam   Constitutional: He appears well-developed and well-nourished.   Cardiovascular: Normal rate and regular rhythm.  Exam reveals no gallop and no friction rub.    No murmur heard.  Pulmonary/Chest: Effort normal and breath sounds normal. No respiratory distress. He has no wheezes. He has no rales.   Musculoskeletal: He exhibits no edema.   Neurological: He is alert.   Skin: Skin is warm and dry. No rash noted.   Vitals reviewed.      Assessment:       1. Controlled type 2 diabetes mellitus with stage 3 chronic kidney disease, with long-term current use of insulin    2. -donor kidney transplant recipient    3. Hypertensive kidney disease with chronic kidney disease, stage 1-4 or unspecified chronic kidney disease    4.  Hyperparathyroidism due to renal insufficiency        Plan:     Mr. Olvera is a 72 year old man with past medical history of  donor kidney transplant in  presenting for follow up of kidney function and immunosuppressive therapy. Creatinine previously elevated from baseline range, will continue to trend. 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      - Immunosuppression: will trend FK level (target 5-7), continue current regimen for now (follow up with Transplant)  - Hypertension: blood pressure at goal, encouraged patient to continue to monitor  - Anemia: Hgb at goal, no indication for erythropoetin therapy   - Bone/mineral metabolism: patient with secondary hyperparathyroidism, PTH at goal for stage CKD  - Hyperkalemia: likely due to elevated FK level (random level, not true trough), discussed low potassium diet, will repeat labs to trend.    Return to clinic 5-6 months pending renal panel, then with renal/heme panel, FK level, iron/TIBC/ferritin, urinalysis/culture, urine protein/creatinine ratio, PTH prior to next visit

## 2018-01-25 RX ORDER — GABAPENTIN 300 MG/1
300 CAPSULE ORAL 3 TIMES DAILY
Qty: 90 CAPSULE | Refills: 0 | Status: SHIPPED | OUTPATIENT
Start: 2018-01-25

## 2018-01-29 DIAGNOSIS — Z94.0 KIDNEY REPLACED BY TRANSPLANT: ICD-10-CM

## 2018-01-29 RX ORDER — GABAPENTIN 300 MG/1
300 CAPSULE ORAL 3 TIMES DAILY
Qty: 90 CAPSULE | Refills: 0 | Status: SHIPPED | OUTPATIENT
Start: 2018-01-29

## 2018-02-12 ENCOUNTER — LAB VISIT (OUTPATIENT)
Dept: LAB | Facility: HOSPITAL | Age: 73
End: 2018-02-12
Attending: FAMILY MEDICINE
Payer: MEDICARE

## 2018-02-12 DIAGNOSIS — E11.22 CONTROLLED TYPE 2 DIABETES MELLITUS WITH STAGE 3 CHRONIC KIDNEY DISEASE, WITH LONG-TERM CURRENT USE OF INSULIN: ICD-10-CM

## 2018-02-12 DIAGNOSIS — E21.3 HYPERPARATHYROIDISM: ICD-10-CM

## 2018-02-12 DIAGNOSIS — Z79.4 CONTROLLED TYPE 2 DIABETES MELLITUS WITH STAGE 3 CHRONIC KIDNEY DISEASE, WITH LONG-TERM CURRENT USE OF INSULIN: ICD-10-CM

## 2018-02-12 DIAGNOSIS — Z94.0 DECEASED-DONOR KIDNEY TRANSPLANT RECIPIENT: ICD-10-CM

## 2018-02-12 DIAGNOSIS — N18.30 CONTROLLED TYPE 2 DIABETES MELLITUS WITH STAGE 3 CHRONIC KIDNEY DISEASE, WITH LONG-TERM CURRENT USE OF INSULIN: ICD-10-CM

## 2018-02-12 LAB
25(OH)D3+25(OH)D2 SERPL-MCNC: 43 NG/ML
ALBUMIN SERPL BCP-MCNC: 3.6 G/DL
ANION GAP SERPL CALC-SCNC: 9 MMOL/L
BASOPHILS # BLD AUTO: 0.04 K/UL
BASOPHILS NFR BLD: 0.4 %
BUN SERPL-MCNC: 27 MG/DL
CALCIUM SERPL-MCNC: 9.9 MG/DL
CHLORIDE SERPL-SCNC: 105 MMOL/L
CO2 SERPL-SCNC: 26 MMOL/L
CREAT SERPL-MCNC: 1.2 MG/DL
DIFFERENTIAL METHOD: ABNORMAL
EOSINOPHIL # BLD AUTO: 0.3 K/UL
EOSINOPHIL NFR BLD: 2.7 %
ERYTHROCYTE [DISTWIDTH] IN BLOOD BY AUTOMATED COUNT: 14.7 %
EST. GFR  (AFRICAN AMERICAN): >60 ML/MIN/1.73 M^2
EST. GFR  (NON AFRICAN AMERICAN): >60 ML/MIN/1.73 M^2
FERRITIN SERPL-MCNC: 28 NG/ML
GLUCOSE SERPL-MCNC: 129 MG/DL
HCT VFR BLD AUTO: 39 %
HGB BLD-MCNC: 12.4 G/DL
IMM GRANULOCYTES # BLD AUTO: 0.03 K/UL
IMM GRANULOCYTES NFR BLD AUTO: 0.3 %
IRON SERPL-MCNC: 68 UG/DL
LYMPHOCYTES # BLD AUTO: 3.4 K/UL
LYMPHOCYTES NFR BLD: 34.7 %
MCH RBC QN AUTO: 30.3 PG
MCHC RBC AUTO-ENTMCNC: 31.8 G/DL
MCV RBC AUTO: 95 FL
MONOCYTES # BLD AUTO: 1.1 K/UL
MONOCYTES NFR BLD: 10.6 %
NEUTROPHILS # BLD AUTO: 5.1 K/UL
NEUTROPHILS NFR BLD: 51.3 %
NRBC BLD-RTO: 0 /100 WBC
PHOSPHATE SERPL-MCNC: 3.1 MG/DL
PLATELET # BLD AUTO: 165 K/UL
PMV BLD AUTO: 12.6 FL
POTASSIUM SERPL-SCNC: 4.5 MMOL/L
PTH-INTACT SERPL-MCNC: 61 PG/ML
RBC # BLD AUTO: 4.09 M/UL
SATURATED IRON: 19 %
SODIUM SERPL-SCNC: 140 MMOL/L
TOTAL IRON BINDING CAPACITY: 352 UG/DL
TRANSFERRIN SERPL-MCNC: 238 MG/DL
WBC # BLD AUTO: 9.91 K/UL

## 2018-02-12 PROCEDURE — 83540 ASSAY OF IRON: CPT

## 2018-02-12 PROCEDURE — 80069 RENAL FUNCTION PANEL: CPT

## 2018-02-12 PROCEDURE — 80197 ASSAY OF TACROLIMUS: CPT

## 2018-02-12 PROCEDURE — 82728 ASSAY OF FERRITIN: CPT

## 2018-02-12 PROCEDURE — 85025 COMPLETE CBC W/AUTO DIFF WBC: CPT

## 2018-02-12 PROCEDURE — 82306 VITAMIN D 25 HYDROXY: CPT

## 2018-02-12 PROCEDURE — 83970 ASSAY OF PARATHORMONE: CPT

## 2018-02-12 PROCEDURE — 36415 COLL VENOUS BLD VENIPUNCTURE: CPT | Mod: PO

## 2018-02-13 LAB — TACROLIMUS BLD-MCNC: 7.4 NG/ML

## 2018-02-27 ENCOUNTER — OFFICE VISIT (OUTPATIENT)
Dept: PODIATRY | Facility: CLINIC | Age: 73
End: 2018-02-27
Payer: MEDICARE

## 2018-02-27 VITALS
TEMPERATURE: 98 F | SYSTOLIC BLOOD PRESSURE: 167 MMHG | HEART RATE: 59 BPM | BODY MASS INDEX: 27.59 KG/M2 | HEIGHT: 74 IN | DIASTOLIC BLOOD PRESSURE: 75 MMHG | WEIGHT: 215 LBS

## 2018-02-27 DIAGNOSIS — L97.512 SKIN ULCER OF TOE OF RIGHT FOOT WITH FAT LAYER EXPOSED: ICD-10-CM

## 2018-02-27 DIAGNOSIS — E10.42 TYPE 1 DIABETES MELLITUS WITH DIABETIC POLYNEUROPATHY: Primary | ICD-10-CM

## 2018-02-27 DIAGNOSIS — B35.1 ONYCHOMYCOSIS DUE TO DERMATOPHYTE: ICD-10-CM

## 2018-02-27 DIAGNOSIS — Z89.432 S/P TRANSMETATARSAL AMPUTATION OF FOOT, LEFT: ICD-10-CM

## 2018-02-27 DIAGNOSIS — I73.9 PAD (PERIPHERAL ARTERY DISEASE): ICD-10-CM

## 2018-02-27 PROCEDURE — 3008F BODY MASS INDEX DOCD: CPT | Mod: S$GLB,,, | Performed by: PODIATRIST

## 2018-02-27 PROCEDURE — 87075 CULTR BACTERIA EXCEPT BLOOD: CPT

## 2018-02-27 PROCEDURE — 11042 DBRDMT SUBQ TIS 1ST 20SQCM/<: CPT | Mod: 59,S$GLB,, | Performed by: PODIATRIST

## 2018-02-27 PROCEDURE — 99213 OFFICE O/P EST LOW 20 MIN: CPT | Mod: 25,S$GLB,, | Performed by: PODIATRIST

## 2018-02-27 PROCEDURE — 87186 SC STD MICRODIL/AGAR DIL: CPT

## 2018-02-27 PROCEDURE — 11720 DEBRIDE NAIL 1-5: CPT | Mod: Q7,59,S$GLB, | Performed by: PODIATRIST

## 2018-02-27 PROCEDURE — 11055 PARING/CUTG B9 HYPRKER LES 1: CPT | Mod: Q7,S$GLB,, | Performed by: PODIATRIST

## 2018-02-27 PROCEDURE — 1159F MED LIST DOCD IN RCRD: CPT | Mod: S$GLB,,, | Performed by: PODIATRIST

## 2018-02-27 PROCEDURE — 1126F AMNT PAIN NOTED NONE PRSNT: CPT | Mod: S$GLB,,, | Performed by: PODIATRIST

## 2018-02-27 PROCEDURE — 99999 PR PBB SHADOW E&M-EST. PATIENT-LVL III: CPT | Mod: PBBFAC,,, | Performed by: PODIATRIST

## 2018-02-27 PROCEDURE — 87077 CULTURE AEROBIC IDENTIFY: CPT

## 2018-02-27 PROCEDURE — 87070 CULTURE OTHR SPECIMN AEROBIC: CPT

## 2018-02-27 NOTE — PROGRESS NOTES
Subjective:      Patient ID: Jesús Olvera is a 72 y.o. male.    Chief Complaint: Diabetes Mellitus (DR. JOINER 2017); Diabetic Foot Exam; and Follow-up    Jesús is a 72 y.o. male who presents to the clinic for evaluation and treatment of high risk feet. Jesús has a past medical history of Anemia associated with chronic renal failure; Arthritis; CAD (coronary atherosclerotic disease); CHF (congestive heart failure); -donor kidney transplant (2004); Diabetes mellitus; Encounter for blood transfusion; GIB (gastrointestinal bleeding) (10/23/2014); Neurogenic bladder; Osteomyelitis of lumbar vertebra (10/20/2014); PVD (peripheral vascular disease); Renal hypertension; Secondary hyperparathyroidism of renal origin; Secondary myopathy; and Thyroid nodule (2016). not having any major problems with feet today other than toenails in need of trimming. Has hx of left TMA, healed. Now having new problem with R great toe with possible wound. Unsure if he stubbed it or hit it against anything. Noticed a dark spot on tip of toe about 4 weeks ago. Has not tried to treat. Relates no drainage or discoloration/swelling to the foot. He has hx of PAD as well. Here for assessment.  This patient has documented high risk feet requiring routine maintenance secondary to diabetes mellitis and those secondary complications of diabetes, as mentioned.     PCP: Dante Joiner MD    Chief Complaint   Patient presents with    Diabetes Mellitus     DR. JOINER 2017    Diabetic Foot Exam    Follow-up         Current shoe gear:  Affected Foot: Rx diabetic extra depth shoes and custom accommodative insoles     Unaffected Foot: Rx diabetic extra depth shoes and custom accommodative insoles    History of Trauma: negative  Sign of Infection: none    Hemoglobin A1C   Date Value Ref Range Status   06/10/2016 7.7 (H) 4.5 - 6.2 % Final   10/17/2014 7.9 (H) 4.5 - 6.2 % Final   2012 8.7 (H) 4.0 - 6.2 % Final          Review of Systems   Constitution: Negative for chills, fever and night sweats.   Cardiovascular: Positive for leg swelling. Negative for chest pain.   Respiratory: Negative for shortness of breath.    Skin: Positive for nail changes (R foot toes only), poor wound healing and suspicious lesions (wound tip of R great toe. ).   Musculoskeletal: Negative for joint pain and joint swelling.        L foot partial amputation.    Gastrointestinal: Negative for diarrhea, nausea and vomiting.   Neurological: Positive for numbness and paresthesias.         Objective:      Physical Exam   Constitutional: He is oriented to person, place, and time. He appears well-developed and well-nourished. No distress.   Cardiovascular: Normal rate and intact distal pulses.    Pulses:       Dorsalis pedis pulses are 1+ on the right side, and 1+ on the left side.        Posterior tibial pulses are 1+ on the right side, and 1+ on the left side.   DP/PT pulses decreased b/l. CRT slightly delayed to toes. Depended rubor noted to toes 1-5 b/l. Foot and ankle appear normal in coloration.    Musculoskeletal: Normal range of motion. He exhibits edema (Mild). He exhibits no tenderness.        Right foot: There is deformity.        Left foot: There is deformity (TMA- healed).   L transmetatarsal amputation   Flexor contracture at DIP and PIP at lesser toes, R.   Neurological: He is alert and oriented to person, place, and time. He exhibits normal muscle tone.   Neurologic: Sharp/dull sensation absent L foot.   Skin: Skin is warm and dry. Lesion noted. No rash noted. He is not diaphoretic. No erythema. No pallor.   Full thickness ulceration noted to tip of R great toe  Measurement: 0.7 x 0.5 x 0.1cm post debridement   Base: 100% granular after debridement, covered with thick yellow fibrous tissue pre debridement.   Border: rolled in  Periphery: viable, slightly hyperkeratotic   Drainage: dry, sanguinous after debridement.   Odor: none  Purulence:  small droplet  Erythema: none  Undermining tunneling: none    Hyperkeratotic lesions at the following locations:   Distal 2nd toe, right.    Toenails 1-5, right, are elongated, thickened by 2-3 mm, discolored/yellowed, dystrophic, brittle with subungual debris.         Psychiatric: He has a normal mood and affect. His behavior is normal. Judgment and thought content normal.   Nursing note and vitals reviewed.              Assessment:       Encounter Diagnoses   Name Primary?    Type 1 diabetes mellitus with diabetic polyneuropathy Yes    PAD (peripheral artery disease)     Skin ulcer of toe of right foot with fat layer exposed     Onychomycosis due to dermatophyte     S/P transmetatarsal amputation of foot, left    This patient has documented high risk feet requiring routine maintenance secondary to diabetes mellitis and those secondary complications of diabetes, as mentioned.        Plan:       Jesús was seen today for diabetes mellitus, diabetic foot exam and follow-up.    Diagnoses and all orders for this visit:    Type 1 diabetes mellitus with diabetic polyneuropathy    PAD (peripheral artery disease)    Skin ulcer of toe of right foot with fat layer exposed  -     Aerobic culture (Specify Source)  -     CULTURE, ANAEROBE    Onychomycosis due to dermatophyte    S/P transmetatarsal amputation of foot, left    - Patient was given written and verbal instructions regarding foot condition.  I counseled the patient on his conditions, their implications and medical management.    - Shoe inspection. Diabetic Foot Education. Patient reminded of the importance of good nutrition and blood sugar control to help prevent podiatric complications of diabetes. Patient instructed on proper foot hygeine. We discussed wearing proper shoe gear, daily foot inspections, never walking without protective shoe gear, caution putting sharp instruments to feet     - Discussed DM foot care:  Wear comfortable, proper fitting shoes. Wash  feet daily. Dry well. After drying, apply moisturizer to feet (no lotion to webspaces). Inspect feet daily for skin breaks, blisters, swelling, or redness. Wear cotton socks (preferably white)  Change socks every day. Do NOT walk barefoot. Do NOT use heating pads or warm/hot water soaks     With patient's permission, nails were aggressively reduced and debrided 1,2,3,4, 5 R and filed to their soft tissue attachment mechanically and with electric , removing all offending nail and debris. Utilizing a #15 scalpel, I trimmed the corns and calluses at the tip of the R 2nd toe down to healthy appearing skin. No blood drawn from this location.    Patient tolerated this well and no blood was drawn. Patient reports relief following the procedure.     Toe ulcer Right Great Toe:   Excisional debridement preformed today. See separate procedure note. Decreased but visible bleeding from the wound.     Possible small vessel disease however DP/PT pulses are palpable at this time. Will monitor improvement of wound over next week. If no improvement will consider CELESTE/Arterial US    Aerobic/anaerobic cultures taken of would. Will await culture results to initiate oral abx. Will start on topical antibiotic Iodosorb ointment daily. Applied today and covered with bandaid. Additional iodosorb given to patient and advised to use daily until follow up.     I warned patient of signs and symptoms of infection including redness, drainage, purulence, odor, streaking, fever, chills, etc and I advised her to seek medical attention (ER or urgent car) if these symptoms arise.     I recommended follow up in 1 week but patient stated he will be going on a vacation to [place], [place] to see family member and will be staying there for 1-2 weeks and would like to follow up after this. He was advised of the risks of open wound an infection which may lead to amputation given his hx of TMA left. He verbalized understanding and would like to come back in  2-3 weeks. Appointment set.

## 2018-02-28 ENCOUNTER — OFFICE VISIT (OUTPATIENT)
Dept: ENDOCRINOLOGY | Facility: CLINIC | Age: 73
End: 2018-02-28
Payer: MEDICARE

## 2018-02-28 VITALS
WEIGHT: 214.94 LBS | DIASTOLIC BLOOD PRESSURE: 70 MMHG | BODY MASS INDEX: 27.59 KG/M2 | HEIGHT: 74 IN | SYSTOLIC BLOOD PRESSURE: 140 MMHG | HEART RATE: 60 BPM

## 2018-02-28 DIAGNOSIS — M81.0 OSTEOPOROSIS, UNSPECIFIED OSTEOPOROSIS TYPE, UNSPECIFIED PATHOLOGICAL FRACTURE PRESENCE: Primary | ICD-10-CM

## 2018-02-28 DIAGNOSIS — E21.3 HYPERPARATHYROIDISM: ICD-10-CM

## 2018-02-28 DIAGNOSIS — Z94.0 DECEASED-DONOR KIDNEY TRANSPLANT: Chronic | ICD-10-CM

## 2018-02-28 DIAGNOSIS — N25.81 SECONDARY HYPERPARATHYROIDISM OF RENAL ORIGIN: ICD-10-CM

## 2018-02-28 PROCEDURE — 3077F SYST BP >= 140 MM HG: CPT | Mod: CPTII,GC,S$GLB, | Performed by: INTERNAL MEDICINE

## 2018-02-28 PROCEDURE — 99214 OFFICE O/P EST MOD 30 MIN: CPT | Mod: GC,S$GLB,, | Performed by: INTERNAL MEDICINE

## 2018-02-28 PROCEDURE — 99999 PR PBB SHADOW E&M-EST. PATIENT-LVL V: CPT | Mod: PBBFAC,GC,, | Performed by: INTERNAL MEDICINE

## 2018-02-28 PROCEDURE — 3078F DIAST BP <80 MM HG: CPT | Mod: CPTII,GC,S$GLB, | Performed by: INTERNAL MEDICINE

## 2018-02-28 NOTE — PROGRESS NOTES
Subjective:      Patient ID: Jesús Olvera is a 72 y.o. male.    Chief Complaint:  Fracture      History of Present Illness  F/u visit for osteoporosis     Has T1DM managed by Dr Lake at [facility] (since age 28), hx of kidney transplant in 2004 (ESRD 2/2 T1DM was on HD for three years prior to tx), renal osteodystrophy, on chronic immunosuppression: cellcept, prograf, prednisone 5mg daily, and peripheral vascular disease LLE stent, CAD, CHF.     Osteopenia on DXA in 2/2017  he has L2 compression fx. He denies clinical fractures.    Taking 2000 iu daily   Mostly in wheelchair, no recent falls, but hx of falls in past (>2 years ago).   Denies kidney stones   Secondary w/u revealed 2HPT due to vit D def (in setting of ckd3- hx RTx 2004)  PTH normalized since repletion of vitamin D. Calcium and phos normal.   He received Prolia in 5/2017 and 11/2017 and is next due in 5/2018.      Review of Systems   Constitutional: Negative for unexpected weight change.   Musculoskeletal: Positive for arthralgias.   Neurological: Positive for weakness.       Objective:   Physical Exam   Constitutional:   In wheelchair   Neck: No thyromegaly present.   Cardiovascular: Normal rate.    Pulmonary/Chest: Effort normal.   Musculoskeletal:        Right foot: There is no deformity.        Left foot: There is no deformity.   Feet:   Right Foot:   Skin Integrity: Negative for ulcer.   Left Foot:   Skin Integrity: Negative for ulcer.   Neurological:        Vitals reviewed.      Lab Review:       BONE MINERAL DENSITY RESULTS: 2/2017  Lumbar Spine: Lumbar bone mineral density L1-L4 is 1.065g/cm2, which is a t-score of -0.1. The z-score is 0.7.    Total Hip: The total hip bone mineral density is 0.851g/cm2.  The t-score is -1.2, and the z-score is -0.9.  Femoral neck BMD is 0.761g/cm2 and the t-score is -1.2.    Rt Forearm: The Rt Forearm bone mineral density is 0.679g/cm2. The t-score is -0.2, and the z-score is 1.0.    COMPARISONS: No Previous  studies available.   Impression       Osteoporosis if vertebral fracture low impact.   Osteopenia, mild of the hip.         Assessment:     1. Osteoporosis, unspecified osteoporosis type, unspecified pathological fracture presence    3. -donor kidney transplant 04    4. Secondary hyperparathyroidism of renal origin          Plan:     Continue prolia for OP, getting injections every may and November.   Repeat BMD due 2019 - follow hip and distal 1/3 radius to assess bone (lumbar DXA difficult to interpret w hardware)   Cont vit D repletion, as this has normalized PTH and will continue to monitor along with ca, phos yearly   Fall prevention stressed.     Discussed w Dr Rincon   rtc 1 year

## 2018-02-28 NOTE — PROCEDURES
"Wound Debridement  Date/Time: 2/27/2018 10:27 PM  Performed by: JANELLE LÓPEZ  Authorized by: JANELLE LÓPEZ     Time out: Immediately prior to procedure a "time out" was called to verify the correct patient, procedure, equipment, support staff and site/side marked as required.    Consent Done?:  Yes (Verbal)    Preparation: Patient was prepped and draped in usual sterile fashion    Local anesthesia used?: No      Wound Details:    Location:  Right foot    Location:  Right 1st Toe    Type of Debridement:  Excisional       Length (cm):  0.7       Area (sq cm):  0.35       Width (cm):  0.5       Percent Debrided (%):  100       Depth (cm):  0.1       Total Area Debrided (sq cm):  0.35    Depth of debridement:  Subcutaneous tissue    Tissue debrided:  Dermis, Epidermis and Subcutaneous    Devitalized tissue debrided:  Biofilm, Callus and Fibrin    Instruments:  Curette    Bleeding:  Minimal  Hemostasis Achieved: Yes    Method Used:  Pressure  Patient tolerance:  Patient tolerated the procedure well with no immediate complications      "

## 2018-03-01 ENCOUNTER — TELEPHONE (OUTPATIENT)
Dept: PODIATRY | Facility: CLINIC | Age: 73
End: 2018-03-01

## 2018-03-01 LAB
BACTERIA SPEC AEROBE CULT: NORMAL
BACTERIA SPEC AEROBE CULT: NORMAL

## 2018-03-01 RX ORDER — SULFAMETHOXAZOLE AND TRIMETHOPRIM 800; 160 MG/1; MG/1
1 TABLET ORAL 2 TIMES DAILY
Qty: 14 TABLET | Refills: 0 | Status: SHIPPED | OUTPATIENT
Start: 2018-03-01 | End: 2018-03-08

## 2018-03-01 NOTE — TELEPHONE ENCOUNTER
----- Message from Keiko Kitchen DPM sent at 3/1/2018  4:25 PM CST -----  Can we call this patient and let him know he grew out 2 bacteria from his wound and I have prescribed 7 days of Bactrim 800 twice daily. Thanks.           I call the patient and told the patient that Dr. Kitchen said that he grew two bacteria from his wound and that Dr. Kitchen call in bactrim and patient should take it twice a day patient said okay.

## 2018-03-05 DIAGNOSIS — N18.30 CHRONIC KIDNEY DISEASE, STAGE III (MODERATE): ICD-10-CM

## 2018-03-05 LAB — BACTERIA SPEC ANAEROBE CULT: NORMAL

## 2018-03-05 RX ORDER — HYDRALAZINE HYDROCHLORIDE 10 MG/1
10 TABLET, FILM COATED ORAL EVERY 12 HOURS
Qty: 60 TABLET | Refills: 11 | Status: SHIPPED | OUTPATIENT
Start: 2018-03-05 | End: 2019-03-05

## 2018-03-07 ENCOUNTER — PATIENT MESSAGE (OUTPATIENT)
Dept: PODIATRY | Facility: CLINIC | Age: 73
End: 2018-03-07

## 2018-03-20 ENCOUNTER — OFFICE VISIT (OUTPATIENT)
Dept: PODIATRY | Facility: CLINIC | Age: 73
End: 2018-03-20
Payer: MEDICARE

## 2018-03-20 VITALS
SYSTOLIC BLOOD PRESSURE: 147 MMHG | BODY MASS INDEX: 27.46 KG/M2 | HEART RATE: 65 BPM | HEIGHT: 74 IN | DIASTOLIC BLOOD PRESSURE: 75 MMHG | WEIGHT: 214 LBS

## 2018-03-20 DIAGNOSIS — I73.9 PAD (PERIPHERAL ARTERY DISEASE): ICD-10-CM

## 2018-03-20 DIAGNOSIS — E10.42 TYPE 1 DIABETES MELLITUS WITH DIABETIC POLYNEUROPATHY: Primary | ICD-10-CM

## 2018-03-20 DIAGNOSIS — Z89.432 STATUS POST TRANSMETATARSAL AMPUTATION OF LEFT FOOT: ICD-10-CM

## 2018-03-20 DIAGNOSIS — L97.512 TOE ULCER, RIGHT, WITH FAT LAYER EXPOSED: ICD-10-CM

## 2018-03-20 PROCEDURE — 99499 UNLISTED E&M SERVICE: CPT | Mod: S$GLB,,, | Performed by: PODIATRIST

## 2018-03-20 PROCEDURE — 99999 PR PBB SHADOW E&M-EST. PATIENT-LVL III: CPT | Mod: PBBFAC,,, | Performed by: PODIATRIST

## 2018-03-20 NOTE — PROGRESS NOTES
Subjective:      Patient ID: Jesús Olvera is a 72 y.o. male.    Chief Complaint: PCP (Marisel 2017) and Follow-up    Jesús is a 72 y.o. male who presents to the clinic for evaluation and treatment of high risk feet. Jesús has a past medical history of Anemia associated with chronic renal failure; Arthritis; CAD (coronary atherosclerotic disease); CHF (congestive heart failure); -donor kidney transplant (2004); Diabetes mellitus; Encounter for blood transfusion; GIB (gastrointestinal bleeding) (10/23/2014); Neurogenic bladder; Osteomyelitis of lumbar vertebra (10/20/2014); PVD (peripheral vascular disease); Renal hypertension; Secondary hyperparathyroidism of renal origin; Secondary myopathy; and Thyroid nodule (2016).  Presents to clinic for follow up of R great toe wound. Has been applying iodosorb and bandaid as discussed. He was unable to use football offloading dressing or Darco shoe because he was going out of town. He is now here for reassessment of this wound. No pain today. .  This patient has documented high risk feet requiring routine maintenance secondary to diabetes mellitis and those secondary complications of diabetes, as mentioned.     PCP: Dante Joiner MD    Chief Complaint   Patient presents with    PCP     Marisel 2017    Follow-up         Current shoe gear:  Affected Foot: Rx diabetic extra depth shoes and custom accommodative insoles     Unaffected Foot: Rx diabetic extra depth shoes and custom accommodative insoles    History of Trauma: negative  Sign of Infection: none    Hemoglobin A1C   Date Value Ref Range Status   06/10/2016 7.7 (H) 4.5 - 6.2 % Final   10/17/2014 7.9 (H) 4.5 - 6.2 % Final   2012 8.7 (H) 4.0 - 6.2 % Final         Review of Systems   Constitution: Negative for chills, fever and night sweats.   Cardiovascular: Positive for leg swelling. Negative for chest pain.   Respiratory: Negative for shortness of breath.    Skin:  Positive for nail changes (R foot only), poor wound healing and suspicious lesions (wound tip of R great toe.  ).   Musculoskeletal: Negative for joint pain and joint swelling.        L foot TMA   Gastrointestinal: Negative for diarrhea, nausea and vomiting.   Neurological: Positive for numbness and paresthesias.         Objective:      Physical Exam   Constitutional: He is oriented to person, place, and time. He appears well-developed and well-nourished. No distress.   Cardiovascular: Normal rate and intact distal pulses.    Pulses:       Dorsalis pedis pulses are 1+ on the right side, and 1+ on the left side.        Posterior tibial pulses are 1+ on the right side, and 1+ on the left side.   DP/PT pulses decreased b/l. CRT slightly delayed to toes. Depended rubor noted to toes 1-5 b/l. Foot and ankle appear normal in coloration.    Musculoskeletal: Normal range of motion. He exhibits edema (Mild). He exhibits no tenderness.        Right foot: There is deformity.        Left foot: There is deformity (TMA- healed).   L transmetatarsal amputation   Flexor contracture at DIP and PIP at lesser toes, R.   Neurological: He is alert and oriented to person, place, and time. He exhibits normal muscle tone.   Neurologic: Sharp/dull sensation absent L foot.   Skin: Skin is warm and dry. Lesion noted. No rash noted. He is not diaphoretic. No erythema. No pallor.   Full thickness ulceration noted to tip of R great toe  Measurement: 0.5 x 0.3x 0.1cm post debridement   Base: 100% granular after debridement, covered with thick yellow biofilm tissue pre debridement.   Border: rolled in  Periphery: viable, slightly hyperkeratotic   Drainage: dry, sanguinous after debridement.   Odor: none  Purulence: none further  Erythema: none  Undermining tunneling: none    Hyperkeratotic lesions at the following locations:   Distal 2nd toe, right---well trimmed     Toenails 1-5, right, dystrophic but well trimmed    Psychiatric: He has a normal  mood and affect. His behavior is normal. Judgment and thought content normal.   Nursing note and vitals reviewed.                  Assessment:       Encounter Diagnoses   Name Primary?    Type 1 diabetes mellitus with diabetic polyneuropathy Yes    Status post transmetatarsal amputation of left foot     PAD (peripheral artery disease)     Toe ulcer, right, with fat layer exposed    This patient has documented high risk feet requiring routine maintenance secondary to diabetes mellitis and those secondary complications of diabetes, as mentioned.        Plan:       Jesús was seen today for pcp and follow-up.    Diagnoses and all orders for this visit:    Type 1 diabetes mellitus with diabetic polyneuropathy    Status post transmetatarsal amputation of left foot    PAD (peripheral artery disease)    Toe ulcer, right, with fat layer exposed    - Patient was given written and verbal instructions regarding foot condition.  I counseled the patient on his conditions, their implications and medical management.    - Shoe inspection. Diabetic Foot Education. Patient reminded of the importance of good nutrition and blood sugar control to help prevent podiatric complications of diabetes. Patient instructed on proper foot hygeine. We discussed wearing proper shoe gear, daily foot inspections, never walking without protective shoe gear, caution putting sharp instruments to feet     - Discussed DM foot care:  Wear comfortable, proper fitting shoes. Wash feet daily. Dry well. After drying, apply moisturizer to feet (no lotion to webspaces). Inspect feet daily for skin breaks, blisters, swelling, or redness. Wear cotton socks (preferably white)  Change socks every day. Do NOT walk barefoot. Do NOT use heating pads or warm/hot water soaks     Toe ulcer Right Great Toe:   Excisional debridement preformed today. See separate procedure note. Decreased but visible bleeding from the wound. Improvement noted in size however in 1 month  it has not decreased even 50%.     Possible small vessel disease however DP/PT pulses are palpable at this time. Will monitor improvement of wound over next week. If no improvement will consider CELESTE/Arterial US    Finished abx . No further SOI at this time. No need for abx currently.     Discussed need for more strict offloading with football dressing and Darco shoe patient agreeable today.     Covered wound with Kalee, wound foam and football. darco shoe for ambualtion daily and at all times while WB.     F/u 1 week, sooner PRN

## 2018-03-21 PROCEDURE — 11042 DBRDMT SUBQ TIS 1ST 20SQCM/<: CPT | Mod: S$GLB,,, | Performed by: PODIATRIST

## 2018-04-05 NOTE — PROGRESS NOTES
I, Sandra Rincon, have personally taken the history and physical exam and I agree with Dr. Rogel's assessment and plan.

## 2018-04-06 DIAGNOSIS — E11.9 TYPE 2 DIABETES MELLITUS WITHOUT COMPLICATION: ICD-10-CM

## 2018-04-20 DIAGNOSIS — E11.9 TYPE 2 DIABETES MELLITUS WITHOUT COMPLICATION: ICD-10-CM

## 2018-07-27 DIAGNOSIS — E11.9 TYPE 2 DIABETES MELLITUS WITHOUT COMPLICATION: ICD-10-CM

## 2018-08-07 ENCOUNTER — TELEPHONE (OUTPATIENT)
Dept: ADMINISTRATIVE | Facility: HOSPITAL | Age: 73
End: 2018-08-07

## 2018-08-07 NOTE — TELEPHONE ENCOUNTER
Patient was phoned to schedule an overdue eye exam and there was no answer. I did leave a voice message and will send a letter.     Virginia JIMENEZ LPN  Clinical Care Coordinator  Internal Medicine  [facility]

## 2018-08-10 ENCOUNTER — TELEPHONE (OUTPATIENT)
Dept: INTERNAL MEDICINE | Facility: CLINIC | Age: 73
End: 2018-08-10

## 2018-08-10 DIAGNOSIS — Z94.0 DECEASED-DONOR KIDNEY TRANSPLANT: ICD-10-CM

## 2018-08-10 DIAGNOSIS — I12.9 RENAL HYPERTENSION: ICD-10-CM

## 2018-08-10 DIAGNOSIS — Z29.89 NEED FOR PROPHYLACTIC IMMUNOTHERAPY: ICD-10-CM

## 2024-11-29 NOTE — PROGRESS NOTES
Subjective:      Patient ID: Jesús Olvera is a 72 y.o. male.    Chief Complaint: Diabetes Mellitus (PCP Dr. Prieto 17); Diabetic Foot Exam; Routine Foot Care; Peripheral Vascular Disease; and Peripheral Neuropathy    Jesús is a 72 y.o. male who presents to the clinic for evaluation and treatment of high risk feet. Jesús has a past medical history of Anemia associated with chronic renal failure; Arthritis; CAD (coronary atherosclerotic disease); CHF (congestive heart failure); -donor kidney transplant (2004); Diabetes mellitus; Encounter for blood transfusion; GIB (gastrointestinal bleeding) (10/23/2014); Neurogenic bladder; Osteomyelitis of lumbar vertebra (10/20/2014); PVD (peripheral vascular disease); Renal hypertension; Secondary hyperparathyroidism of renal origin; Secondary myopathy; and Thyroid nodule (2016).  This patient has documented high risk feet requiring routine maintenance secondary to diabetes mellitis and those secondary complications of diabetes, as mentioned.     PCP: Dante Joiner MD    Nephrology, Dr. Thomson 17  Chief Complaint   Patient presents with    Diabetes Mellitus     PCP Dr. Prieto 17    Diabetic Foot Exam    Routine Foot Care    Peripheral Vascular Disease    Peripheral Neuropathy         Current shoe gear:  Affected Foot: Rx diabetic extra depth shoes and custom accommodative insoles     Unaffected Foot: Rx diabetic extra depth shoes and custom accommodative insoles    History of Trauma: negative  Sign of Infection: none    Hemoglobin A1C   Date Value Ref Range Status   06/10/2016 7.7 (H) 4.5 - 6.2 % Final   10/17/2014 7.9 (H) 4.5 - 6.2 % Final   2012 8.7 (H) 4.0 - 6.2 % Final         Review of Systems   Constitution: Negative for chills, fever and night sweats.   Cardiovascular: Positive for leg swelling. Negative for chest pain.   Respiratory: Negative for shortness of breath.    Skin: Positive for poor wound healing.    Musculoskeletal: Negative for joint pain and joint swelling.        L foot partial amputation.    Gastrointestinal: Negative for diarrhea, nausea and vomiting.   Neurological: Positive for numbness and paresthesias.         Objective:      Physical Exam   Constitutional: He is oriented to person, place, and time. He appears well-developed and well-nourished. No distress.   Cardiovascular: Normal rate and intact distal pulses.    Musculoskeletal: Normal range of motion. He exhibits edema (Mild). He exhibits no tenderness.        Right foot: There is deformity.        Left foot: There is deformity.   L transmetatarsal amputation   Flexor contracture at DIP and PIP at lesser toes, R.   Neurological: He is alert and oriented to person, place, and time. He exhibits normal muscle tone.   Neurologic: Sharp/dull sensation absent L foot.   Skin: Skin is warm and dry. No rash noted. He is not diaphoretic. No erythema. No pallor.   Hyperkeratotic lesions at the following locations:   Distal 2nd toe, right.    Toenails 1-5, right, are elongated, thickened by 2-3 mm, discolored/yellowed, dystrophic, brittle with subungual debris.         Psychiatric: He has a normal mood and affect. His behavior is normal. Judgment and thought content normal.   Nursing note and vitals reviewed.          Assessment:       Encounter Diagnoses   Name Primary?    Diabetic polyneuropathy associated with type 2 diabetes mellitus Yes    PVD (peripheral vascular disease)     S/P transmetatarsal amputation of foot, left     Onychomycosis due to dermatophyte    This patient has documented high risk feet requiring routine maintenance secondary to diabetes mellitis and those secondary complications of diabetes, as mentioned.        Plan:       Jesús was seen today for diabetes mellitus, diabetic foot exam, routine foot care, peripheral vascular disease and peripheral neuropathy.    Diagnoses and all orders for this visit:    Diabetic polyneuropathy  associated with type 2 diabetes mellitus    PVD (peripheral vascular disease)    S/P transmetatarsal amputation of foot, left    Onychomycosis due to dermatophyte    - Patient was given written and verbal instructions regarding foot condition.  I counseled the patient on his conditions, their implications and medical management.      - Extra depth diabetic shoe    - Shoe inspection. Diabetic Foot Education. Patient reminded of the importance of good nutrition and blood sugar control to help prevent podiatric complications of diabetes. Patient instructed on proper foot hygeine. We discussed wearing proper shoe gear, daily foot inspections, never walking without protective shoe gear, never putting sharp instruments to feet    - With patient's permission, nails were aggressively reduced and debrided x 5 to their soft tissue attachment mechanically and with electric , removing all offending nail and debris. Patient relates relief following the procedure.     - After cleansing the  area w/ alcohol prep pad the above mentioned hyperkeratosis was trimmed utilizing No 15 scapel, to a smooth base with out incident. Patient tolerated this  well and reported comfort to the area of right 2nd toe.   No Follow-up on file.                no